# Patient Record
Sex: MALE | Race: WHITE | NOT HISPANIC OR LATINO | Employment: OTHER | URBAN - NONMETROPOLITAN AREA
[De-identification: names, ages, dates, MRNs, and addresses within clinical notes are randomized per-mention and may not be internally consistent; named-entity substitution may affect disease eponyms.]

---

## 2019-03-05 ENCOUNTER — HISTORICAL (OUTPATIENT)
Dept: ADMINISTRATIVE | Facility: HOSPITAL | Age: 44
End: 2019-03-05

## 2019-09-08 ENCOUNTER — HISTORICAL (OUTPATIENT)
Dept: ADMINISTRATIVE | Facility: HOSPITAL | Age: 44
End: 2019-09-08

## 2019-09-09 ENCOUNTER — HISTORICAL (OUTPATIENT)
Dept: ADMINISTRATIVE | Facility: HOSPITAL | Age: 44
End: 2019-09-09

## 2020-08-05 ENCOUNTER — HISTORICAL (OUTPATIENT)
Dept: ADMINISTRATIVE | Facility: HOSPITAL | Age: 45
End: 2020-08-05

## 2024-01-03 ENCOUNTER — LAB REQUISITION (OUTPATIENT)
Dept: LAB | Facility: HOSPITAL | Age: 49
End: 2024-01-03
Payer: MEDICAID

## 2024-01-03 DIAGNOSIS — K62.5 HEMORRHAGE OF ANUS AND RECTUM: ICD-10-CM

## 2024-01-03 DIAGNOSIS — Z12.5 ENCOUNTER FOR SCREENING FOR MALIGNANT NEOPLASM OF PROSTATE: ICD-10-CM

## 2024-01-03 LAB
ALBUMIN SERPL-MCNC: 4.3 G/DL (ref 3.4–5)
ALBUMIN/GLOB SERPL: 1.2 RATIO
ALP SERPL-CCNC: 81 UNIT/L (ref 50–144)
ALT SERPL-CCNC: 54 UNIT/L (ref 1–45)
ANION GAP SERPL CALC-SCNC: 8 MEQ/L (ref 2–13)
AST SERPL-CCNC: 56 UNIT/L (ref 17–59)
BASOPHILS # BLD AUTO: 0.03 X10(3)/MCL (ref 0.01–0.08)
BASOPHILS NFR BLD AUTO: 0.5 % (ref 0.1–1.2)
BILIRUB SERPL-MCNC: 1.3 MG/DL (ref 0–1)
BUN SERPL-MCNC: 16 MG/DL (ref 7–20)
CALCIUM SERPL-MCNC: 9.5 MG/DL (ref 8.4–10.2)
CHLORIDE SERPL-SCNC: 106 MMOL/L (ref 98–110)
CHOLEST SERPL-MCNC: 224 MG/DL (ref 0–200)
CO2 SERPL-SCNC: 25 MMOL/L (ref 21–32)
CREAT SERPL-MCNC: 0.91 MG/DL (ref 0.66–1.25)
CREAT/UREA NIT SERPL: 18 (ref 12–20)
EOSINOPHIL # BLD AUTO: 0.17 X10(3)/MCL (ref 0.04–0.54)
EOSINOPHIL NFR BLD AUTO: 2.6 % (ref 0.7–7)
ERYTHROCYTE [DISTWIDTH] IN BLOOD BY AUTOMATED COUNT: 12.3 %
GFR SERPLBLD CREATININE-BSD FMLA CKD-EPI: >90 MLS/MIN/1.73/M2
GLOBULIN SER-MCNC: 3.6 GM/DL (ref 2–3.9)
GLUCOSE SERPL-MCNC: 122 MG/DL (ref 70–115)
HCT VFR BLD AUTO: 48.6 % (ref 36–52)
HDLC SERPL-MCNC: 39 MG/DL (ref 40–60)
HGB BLD-MCNC: 16.5 G/DL (ref 13–18)
IMM GRANULOCYTES # BLD AUTO: 0.02 X10(3)/MCL (ref 0–0.03)
IMM GRANULOCYTES NFR BLD AUTO: 0.3 % (ref 0–0.5)
LDLC SERPL DIRECT ASSAY-SCNC: 138.6 MG/DL (ref 30–100)
LYMPHOCYTES # BLD AUTO: 1.53 X10(3)/MCL (ref 1.32–3.57)
LYMPHOCYTES NFR BLD AUTO: 23.1 % (ref 20–55)
MCH RBC QN AUTO: 31.3 PG (ref 27–34)
MCHC RBC AUTO-ENTMCNC: 34 G/DL (ref 31–37)
MCV RBC AUTO: 92.2 FL (ref 79–99)
MONOCYTES # BLD AUTO: 0.5 X10(3)/MCL (ref 0.3–0.82)
MONOCYTES NFR BLD AUTO: 7.6 % (ref 4.7–12.5)
NEUTROPHILS # BLD AUTO: 4.36 X10(3)/MCL (ref 1.78–5.38)
NEUTROPHILS NFR BLD AUTO: 65.9 % (ref 37–73)
NRBC BLD AUTO-RTO: 0 %
PLATELET # BLD AUTO: 206 X10(3)/MCL (ref 140–371)
PMV BLD AUTO: 11.6 FL (ref 9.4–12.4)
POTASSIUM SERPL-SCNC: 4.4 MMOL/L (ref 3.5–5.1)
PROT SERPL-MCNC: 7.9 GM/DL (ref 6.3–8.2)
PSA SERPL-MCNC: 0.46 NG/ML
RBC # BLD AUTO: 5.27 X10(6)/MCL (ref 4–6)
SODIUM SERPL-SCNC: 139 MMOL/L (ref 135–145)
TRIGL SERPL-MCNC: 168 MG/DL (ref 30–200)
WBC # SPEC AUTO: 6.61 X10(3)/MCL (ref 4–11.5)

## 2024-01-03 PROCEDURE — 84153 ASSAY OF PSA TOTAL: CPT | Performed by: FAMILY MEDICINE

## 2024-01-03 PROCEDURE — 85025 COMPLETE CBC W/AUTO DIFF WBC: CPT | Performed by: FAMILY MEDICINE

## 2024-01-03 PROCEDURE — 80053 COMPREHEN METABOLIC PANEL: CPT | Performed by: FAMILY MEDICINE

## 2024-01-03 PROCEDURE — 80061 LIPID PANEL: CPT | Performed by: FAMILY MEDICINE

## 2024-01-09 ENCOUNTER — HOSPITAL ENCOUNTER (OUTPATIENT)
Dept: PREADMISSION TESTING | Facility: HOSPITAL | Age: 49
Discharge: HOME | End: 2024-01-09
Attending: FAMILY MEDICINE
Payer: MEDICAID

## 2024-01-09 VITALS — WEIGHT: 315 LBS | BODY MASS INDEX: 42.66 KG/M2 | HEIGHT: 72 IN

## 2024-01-09 DIAGNOSIS — Z12.11 SCREENING FOR COLON CANCER: ICD-10-CM

## 2024-01-09 DIAGNOSIS — Z12.11 COLON CANCER SCREENING: Primary | ICD-10-CM

## 2024-01-09 RX ORDER — FLUTICASONE PROPIONATE AND SALMETEROL XINAFOATE 230; 21 UG/1; UG/1
2 AEROSOL, METERED RESPIRATORY (INHALATION) EVERY 12 HOURS
COMMUNITY
Start: 2023-12-21

## 2024-01-09 RX ORDER — DOXAZOSIN 1 MG/1
1 TABLET ORAL DAILY
COMMUNITY
Start: 2023-12-21

## 2024-01-09 RX ORDER — SODIUM CHLORIDE, SODIUM LACTATE, POTASSIUM CHLORIDE, CALCIUM CHLORIDE 600; 310; 30; 20 MG/100ML; MG/100ML; MG/100ML; MG/100ML
INJECTION, SOLUTION INTRAVENOUS CONTINUOUS
Status: CANCELLED | OUTPATIENT
Start: 2024-01-12

## 2024-01-09 RX ORDER — FUROSEMIDE 40 MG/1
40 TABLET ORAL DAILY
COMMUNITY
Start: 2023-12-21

## 2024-01-09 RX ORDER — PANTOPRAZOLE SODIUM 40 MG/1
40 TABLET, DELAYED RELEASE ORAL DAILY
COMMUNITY
Start: 2023-12-21

## 2024-01-09 RX ORDER — POTASSIUM CHLORIDE 20 MEQ/1
20 TABLET, EXTENDED RELEASE ORAL ONCE
COMMUNITY
Start: 2023-12-21

## 2024-01-09 RX ORDER — SOTALOL HYDROCHLORIDE 80 MG/1
80 TABLET ORAL 2 TIMES DAILY
COMMUNITY
Start: 2023-10-23

## 2024-01-09 RX ORDER — DILTIAZEM HYDROCHLORIDE EXTENDED-RELEASE TABLETS 360 MG/1
360 TABLET, EXTENDED RELEASE ORAL DAILY
COMMUNITY

## 2024-01-09 RX ORDER — RIVAROXABAN 20 MG/1
20 TABLET, FILM COATED ORAL DAILY
COMMUNITY
Start: 2023-12-21

## 2024-01-09 RX ORDER — ALBUTEROL SULFATE 90 UG/1
2 AEROSOL, METERED RESPIRATORY (INHALATION)
COMMUNITY
Start: 2023-10-12

## 2024-01-09 RX ORDER — ALLOPURINOL 100 MG/1
100 TABLET ORAL DAILY
COMMUNITY

## 2024-01-09 NOTE — DISCHARGE INSTRUCTIONS

## 2024-01-11 ENCOUNTER — ANESTHESIA EVENT (OUTPATIENT)
Dept: GASTROENTEROLOGY | Facility: HOSPITAL | Age: 49
End: 2024-01-11
Payer: MEDICAID

## 2024-01-11 NOTE — ANESTHESIA PREPROCEDURE EVALUATION
01/11/2024  Daniel Kirkland is a 49 y.o., male.      Pre-op Assessment    I have reviewed the Patient Summary Reports.     I have reviewed the Nursing Notes. I have reviewed the NPO Status.   I have reviewed the Medications.     Review of Systems  Anesthesia Hx:  No problems with previous Anesthesia             Denies Family Hx of Anesthesia complications.    Denies Personal Hx of Anesthesia complications.                    Social:  Smoker       Hematology/Oncology:  Hematology Normal   Oncology Normal                                   EENT/Dental:  EENT/Dental Normal           Cardiovascular:  Exercise tolerance: poor   Hypertension    Dysrhythmias atrial fibrillation                                   Pulmonary:        Sleep Apnea                Renal/:  Renal/ Normal                 Hepatic/GI:  Hepatic/GI Normal                 Musculoskeletal:  Musculoskeletal Normal                Neurological:  Neurology Normal                                      Endocrine:  Endocrine Normal          Morbid Obesity / BMI > 40  Dermatological:  Skin Normal    Psych:  Psychiatric Normal                    Physical Exam  General: Well nourished, Cooperative, Alert and Oriented    Airway:  Mallampati: II / II  Mouth Opening: Normal  TM Distance: Normal  Tongue: Normal  Neck ROM: Normal ROM    Dental:  Intact        Anesthesia Plan  Type of Anesthesia, risks & benefits discussed:    Anesthesia Type: MAC  Intra-op Monitoring Plan: Standard ASA Monitors  Post Op Pain Control Plan: multimodal analgesia  Induction:  IV  Informed Consent: Informed consent signed with the Patient and all parties understand the risks and agree with anesthesia plan.  All questions answered. Patient consented to blood products? Yes  ASA Score: 4  Day of Surgery Review of History & Physical: H&P Update referred to the surgeon/provider.I have  interviewed and examined the patient. I have reviewed the patient's H&P dated: There are no significant changes.     Ready For Surgery From Anesthesia Perspective.     .

## 2024-01-12 ENCOUNTER — ANESTHESIA (OUTPATIENT)
Dept: GASTROENTEROLOGY | Facility: HOSPITAL | Age: 49
End: 2024-01-12
Payer: MEDICAID

## 2024-01-12 ENCOUNTER — HOSPITAL ENCOUNTER (OUTPATIENT)
Dept: GASTROENTEROLOGY | Facility: HOSPITAL | Age: 49
Discharge: HOME | End: 2024-01-12
Attending: FAMILY MEDICINE
Payer: MEDICAID

## 2024-01-12 VITALS
SYSTOLIC BLOOD PRESSURE: 109 MMHG | TEMPERATURE: 98 F | RESPIRATION RATE: 20 BRPM | WEIGHT: 315 LBS | DIASTOLIC BLOOD PRESSURE: 73 MMHG | BODY MASS INDEX: 68.08 KG/M2 | HEART RATE: 68 BPM | OXYGEN SATURATION: 99 %

## 2024-01-12 DIAGNOSIS — Z12.11 COLON CANCER SCREENING: ICD-10-CM

## 2024-01-12 DIAGNOSIS — Z12.11 SCREENING FOR COLON CANCER: ICD-10-CM

## 2024-01-12 DIAGNOSIS — K62.5 HEMORRHAGE OF RECTUM AND ANUS: ICD-10-CM

## 2024-01-12 LAB — POCT GLUCOSE: 89 MG/DL (ref 70–110)

## 2024-01-12 PROCEDURE — 25000003 PHARM REV CODE 250: Performed by: NURSE ANESTHETIST, CERTIFIED REGISTERED

## 2024-01-12 PROCEDURE — 45378 DIAGNOSTIC COLONOSCOPY: CPT | Performed by: FAMILY MEDICINE

## 2024-01-12 PROCEDURE — 63600175 PHARM REV CODE 636 W HCPCS: Performed by: NURSE ANESTHETIST, CERTIFIED REGISTERED

## 2024-01-12 PROCEDURE — S5010 5% DEXTROSE AND 0.45% SALINE: HCPCS | Performed by: FAMILY MEDICINE

## 2024-01-12 PROCEDURE — 25000003 PHARM REV CODE 250: Performed by: FAMILY MEDICINE

## 2024-01-12 PROCEDURE — 82962 GLUCOSE BLOOD TEST: CPT

## 2024-01-12 PROCEDURE — 37000009 HC ANESTHESIA EA ADD 15 MINS

## 2024-01-12 PROCEDURE — 37000008 HC ANESTHESIA 1ST 15 MINUTES

## 2024-01-12 PROCEDURE — D9220A PRA ANESTHESIA: Mod: ,,, | Performed by: NURSE ANESTHETIST, CERTIFIED REGISTERED

## 2024-01-12 RX ORDER — SODIUM CHLORIDE, SODIUM LACTATE, POTASSIUM CHLORIDE, CALCIUM CHLORIDE 600; 310; 30; 20 MG/100ML; MG/100ML; MG/100ML; MG/100ML
INJECTION, SOLUTION INTRAVENOUS CONTINUOUS
Status: DISCONTINUED | OUTPATIENT
Start: 2024-01-12 | End: 2024-01-13 | Stop reason: HOSPADM

## 2024-01-12 RX ORDER — DEXTROSE MONOHYDRATE AND SODIUM CHLORIDE 5; .45 G/100ML; G/100ML
INJECTION, SOLUTION INTRAVENOUS CONTINUOUS
Status: DISCONTINUED | OUTPATIENT
Start: 2024-01-12 | End: 2024-01-13 | Stop reason: HOSPADM

## 2024-01-12 RX ORDER — SOTALOL HYDROCHLORIDE 80 MG/1
80 TABLET ORAL ONCE
Status: COMPLETED | OUTPATIENT
Start: 2024-01-12 | End: 2024-01-12

## 2024-01-12 RX ORDER — PROPOFOL 10 MG/ML
VIAL (ML) INTRAVENOUS
Status: DISCONTINUED | OUTPATIENT
Start: 2024-01-12 | End: 2024-01-12

## 2024-01-12 RX ORDER — LIDOCAINE HYDROCHLORIDE 20 MG/ML
INJECTION INTRAVENOUS
Status: DISCONTINUED | OUTPATIENT
Start: 2024-01-12 | End: 2024-01-12

## 2024-01-12 RX ADMIN — SOTALOL HYDROCHLORIDE 80 MG: 80 TABLET ORAL at 07:01

## 2024-01-12 RX ADMIN — PROPOFOL 50 MG: 10 INJECTION, EMULSION INTRAVENOUS at 09:01

## 2024-01-12 RX ADMIN — LIDOCAINE HYDROCHLORIDE 50 MG: 20 INJECTION, SOLUTION INTRAVENOUS at 09:01

## 2024-01-12 RX ADMIN — PROPOFOL 90 MG: 10 INJECTION, EMULSION INTRAVENOUS at 09:01

## 2024-01-12 RX ADMIN — DEXTROSE AND SODIUM CHLORIDE: 5; 450 INJECTION, SOLUTION INTRAVENOUS at 07:01

## 2024-01-12 NOTE — PLAN OF CARE
Pt stable and ready for discharge. Pt offered wheelchair,, declines. Pt ambulated out of dept with family at side.

## 2024-01-12 NOTE — LETTER
January 12, 2024      [facility]  [address] [place] [address]  Phone: [phone]  Fax: [phone]       Patient: Daniel Kirkland   YOB: 1975  Date of Visit: 01/12/2024    To Whom It May Concern:    Veronica Kirkland was with father,Olu Kirkland at [facility] on 01/12/2024. Please excuse from school. If you have any questions or concerns, or if I can be of further assistance, please do not hesitate to contact me.    Sincerely,    Latrice Flores RN

## 2024-01-12 NOTE — DISCHARGE INSTRUCTIONS
Follow-up with    Diet: as tolerated  Activity:  decrease activity today, no driving today, resume all activity tomorrow  Notify MD:  increased swelling of abdomen, excessive nausea/vomiting, excessive bright red bleeding from rectum  Medications:  continue your home medications. Keep a list of your home medications at all times for emergencies.

## 2024-01-12 NOTE — DISCHARGE SUMMARY
[facility]  Discharge Note  Short Stay    Colonoscopy      OUTCOME: Patient tolerated treatment/procedure well without complication and is now ready for discharge.    DISPOSITION: Home or Self Care    FINAL DIAGNOSIS:  <principal problem not specified>    FOLLOWUP: In clinic    DISCHARGE INSTRUCTIONS:  No discharge procedures on file.     TIME SPENT ON DISCHARGE:  minutes

## 2024-01-12 NOTE — ANESTHESIA POSTPROCEDURE EVALUATION
Anesthesia Post Evaluation    Patient: Daniel Kirkland    Procedure(s) Performed: * No procedures listed *    Final Anesthesia Type: MAC      Patient location during evaluation: OPS  Patient participation: Yes- Able to Participate  Level of consciousness: awake and alert, awake and oriented  Post-procedure vital signs: reviewed and stable  Pain management: adequate  Airway patency: patent    PONV status at discharge: No PONV  Anesthetic complications: no      Cardiovascular status: blood pressure returned to baseline  Respiratory status: unassisted, room air and spontaneous ventilation  Hydration status: euvolemic  Follow-up not needed.              Vitals Value Taken Time   /97 01/12/24 0645   Temp 36 °C (96.8 °F) 01/12/24 0645   Pulse 89 01/12/24 0645   Resp 22 01/12/24 0645   SpO2 95 % 01/12/24 0645         No case tracking events are documented in the log.      Pain/Zay Score: No data recorded

## 2024-07-23 ENCOUNTER — HOSPITAL ENCOUNTER (EMERGENCY)
Facility: HOSPITAL | Age: 49
Discharge: HOME | End: 2024-07-24
Attending: EMERGENCY MEDICINE
Payer: MEDICAID

## 2024-07-23 DIAGNOSIS — I21.4 NSTEMI (NON-ST ELEVATED MYOCARDIAL INFARCTION): Primary | ICD-10-CM

## 2024-07-23 DIAGNOSIS — R07.9 CHEST PAIN: ICD-10-CM

## 2024-07-23 LAB
ALBUMIN SERPL-MCNC: 3.9 G/DL (ref 3.4–5)
ALBUMIN/GLOB SERPL: 1.1 RATIO
ALP SERPL-CCNC: 85 UNIT/L (ref 50–144)
ALT SERPL-CCNC: 47 UNIT/L (ref 1–45)
ANION GAP SERPL CALC-SCNC: 7 MEQ/L (ref 2–13)
AST SERPL-CCNC: 41 UNIT/L (ref 17–59)
BASOPHILS # BLD AUTO: 0.04 X10(3)/MCL (ref 0.01–0.08)
BASOPHILS NFR BLD AUTO: 0.6 % (ref 0.1–1.2)
BILIRUB SERPL-MCNC: 1.1 MG/DL (ref 0–1)
BNP BLD-MCNC: 159 PG/ML (ref 0–124.9)
BUN SERPL-MCNC: 11 MG/DL (ref 7–20)
CALCIUM SERPL-MCNC: 9.4 MG/DL (ref 8.4–10.2)
CHLORIDE SERPL-SCNC: 109 MMOL/L (ref 98–110)
CO2 SERPL-SCNC: 24 MMOL/L (ref 21–32)
CREAT SERPL-MCNC: 0.91 MG/DL (ref 0.66–1.25)
CREAT/UREA NIT SERPL: 12 (ref 12–20)
D DIMER PPP IA.FEU-MCNC: 0.49 MG/L (ref 0.19–0.5)
EOSINOPHIL # BLD AUTO: 0.27 X10(3)/MCL (ref 0.04–0.54)
EOSINOPHIL NFR BLD AUTO: 3.8 % (ref 0.7–7)
ERYTHROCYTE [DISTWIDTH] IN BLOOD BY AUTOMATED COUNT: 12.8 %
GFR SERPLBLD CREATININE-BSD FMLA CKD-EPI: >90 ML/MIN/1.73/M2
GLOBULIN SER-MCNC: 3.7 GM/DL (ref 2–3.9)
GLUCOSE SERPL-MCNC: 139 MG/DL (ref 70–115)
HCT VFR BLD AUTO: 47.2 % (ref 36–52)
HGB BLD-MCNC: 15.9 G/DL (ref 13–18)
IMM GRANULOCYTES # BLD AUTO: 0.03 X10(3)/MCL (ref 0–0.03)
IMM GRANULOCYTES NFR BLD AUTO: 0.4 % (ref 0–0.5)
LIPASE SERPL-CCNC: 37 U/L (ref 23–300)
LYMPHOCYTES # BLD AUTO: 2.24 X10(3)/MCL (ref 1.32–3.57)
LYMPHOCYTES NFR BLD AUTO: 31.4 % (ref 20–55)
MCH RBC QN AUTO: 30.9 PG (ref 27–34)
MCHC RBC AUTO-ENTMCNC: 33.7 G/DL (ref 31–37)
MCV RBC AUTO: 91.7 FL (ref 79–99)
MONOCYTES # BLD AUTO: 0.59 X10(3)/MCL (ref 0.3–0.82)
MONOCYTES NFR BLD AUTO: 8.3 % (ref 4.7–12.5)
NEUTROPHILS # BLD AUTO: 3.97 X10(3)/MCL (ref 1.78–5.38)
NEUTROPHILS NFR BLD AUTO: 55.5 % (ref 37–73)
NRBC BLD AUTO-RTO: 0 %
PLATELET # BLD AUTO: 178 X10(3)/MCL (ref 140–371)
PMV BLD AUTO: 11.1 FL (ref 9.4–12.4)
POTASSIUM SERPL-SCNC: 4.4 MMOL/L (ref 3.5–5.1)
PROT SERPL-MCNC: 7.6 GM/DL (ref 6.3–8.2)
RBC # BLD AUTO: 5.15 X10(6)/MCL (ref 4–6)
SODIUM SERPL-SCNC: 140 MMOL/L (ref 136–145)
TROPONIN I SERPL-MCNC: <0.012 NG/ML (ref 0–0.03)
WBC # BLD AUTO: 7.14 X10(3)/MCL (ref 4–11.5)

## 2024-07-23 PROCEDURE — 25000003 PHARM REV CODE 250: Performed by: EMERGENCY MEDICINE

## 2024-07-23 PROCEDURE — 96374 THER/PROPH/DIAG INJ IV PUSH: CPT

## 2024-07-23 PROCEDURE — 99291 CRITICAL CARE FIRST HOUR: CPT

## 2024-07-23 PROCEDURE — 85025 COMPLETE CBC W/AUTO DIFF WBC: CPT | Performed by: EMERGENCY MEDICINE

## 2024-07-23 PROCEDURE — 83880 ASSAY OF NATRIURETIC PEPTIDE: CPT | Performed by: EMERGENCY MEDICINE

## 2024-07-23 PROCEDURE — 63600175 PHARM REV CODE 636 W HCPCS: Performed by: EMERGENCY MEDICINE

## 2024-07-23 PROCEDURE — 85379 FIBRIN DEGRADATION QUANT: CPT | Performed by: EMERGENCY MEDICINE

## 2024-07-23 PROCEDURE — 80053 COMPREHEN METABOLIC PANEL: CPT | Performed by: EMERGENCY MEDICINE

## 2024-07-23 PROCEDURE — 84484 ASSAY OF TROPONIN QUANT: CPT | Performed by: EMERGENCY MEDICINE

## 2024-07-23 PROCEDURE — 93010 ELECTROCARDIOGRAM REPORT: CPT | Mod: ,,, | Performed by: INTERNAL MEDICINE

## 2024-07-23 PROCEDURE — 93005 ELECTROCARDIOGRAM TRACING: CPT

## 2024-07-23 PROCEDURE — 25000242 PHARM REV CODE 250 ALT 637 W/ HCPCS

## 2024-07-23 PROCEDURE — 83690 ASSAY OF LIPASE: CPT | Performed by: EMERGENCY MEDICINE

## 2024-07-23 RX ORDER — KETOROLAC TROMETHAMINE 30 MG/ML
30 INJECTION, SOLUTION INTRAMUSCULAR; INTRAVENOUS
Status: COMPLETED | OUTPATIENT
Start: 2024-07-23 | End: 2024-07-23

## 2024-07-23 RX ORDER — NITROGLYCERIN 0.4 MG/1
TABLET SUBLINGUAL
Status: COMPLETED
Start: 2024-07-23 | End: 2024-07-23

## 2024-07-23 RX ORDER — ALUMINUM HYDROXIDE, MAGNESIUM HYDROXIDE, AND SIMETHICONE 1200; 120; 1200 MG/30ML; MG/30ML; MG/30ML
30 SUSPENSION ORAL ONCE
Status: COMPLETED | OUTPATIENT
Start: 2024-07-23 | End: 2024-07-23

## 2024-07-23 RX ORDER — NITROGLYCERIN 0.4 MG/1
0.4 TABLET SUBLINGUAL EVERY 5 MIN PRN
Status: DISCONTINUED | OUTPATIENT
Start: 2024-07-23 | End: 2024-07-24 | Stop reason: HOSPADM

## 2024-07-23 RX ORDER — LIDOCAINE HYDROCHLORIDE 20 MG/ML
15 SOLUTION OROPHARYNGEAL ONCE
Status: COMPLETED | OUTPATIENT
Start: 2024-07-23 | End: 2024-07-23

## 2024-07-23 RX ORDER — ASPIRIN 325 MG
325 TABLET ORAL
Status: COMPLETED | OUTPATIENT
Start: 2024-07-23 | End: 2024-07-23

## 2024-07-23 RX ADMIN — KETOROLAC TROMETHAMINE 30 MG: 30 INJECTION, SOLUTION INTRAMUSCULAR at 11:07

## 2024-07-23 RX ADMIN — NITROGLYCERIN 1 INCH: 20 OINTMENT TOPICAL at 09:07

## 2024-07-23 RX ADMIN — NITROGLYCERIN 1.2 MG: 0.4 TABLET SUBLINGUAL at 10:07

## 2024-07-23 RX ADMIN — LIDOCAINE HYDROCHLORIDE 15 ML: 20 SOLUTION ORAL at 10:07

## 2024-07-23 RX ADMIN — ALUMINUM HYDROXIDE, MAGNESIUM HYDROXIDE, AND SIMETHICONE 30 ML: 1200; 120; 1200 SUSPENSION ORAL at 10:07

## 2024-07-23 RX ADMIN — ASPIRIN 325 MG ORAL TABLET 325 MG: 325 PILL ORAL at 09:07

## 2024-07-24 ENCOUNTER — HOSPITAL ENCOUNTER (INPATIENT)
Facility: HOSPITAL | Age: 49
LOS: 1 days | Discharge: HOME | DRG: 251 | End: 2024-07-25
Attending: INTERNAL MEDICINE | Admitting: INTERNAL MEDICINE
Payer: MEDICAID

## 2024-07-24 VITALS
HEART RATE: 63 BPM | RESPIRATION RATE: 13 BRPM | TEMPERATURE: 98 F | SYSTOLIC BLOOD PRESSURE: 117 MMHG | DIASTOLIC BLOOD PRESSURE: 76 MMHG | WEIGHT: 315 LBS | BODY MASS INDEX: 42.66 KG/M2 | OXYGEN SATURATION: 95 % | HEIGHT: 72 IN

## 2024-07-24 DIAGNOSIS — R07.9 CHEST PAIN: ICD-10-CM

## 2024-07-24 DIAGNOSIS — I21.4 NSTEMI (NON-ST ELEVATED MYOCARDIAL INFARCTION): ICD-10-CM

## 2024-07-24 DIAGNOSIS — I25.10 CAD (CORONARY ARTERY DISEASE): ICD-10-CM

## 2024-07-24 LAB
APICAL FOUR CHAMBER EJECTION FRACTION: 57 %
APICAL TWO CHAMBER EJECTION FRACTION: 76 %
APTT PPP: 27.1 SECONDS (ref 23–29.4)
APTT PPP: 52 SECONDS (ref 23.2–33.7)
AV INDEX (PROSTH): 0.83
AV MEAN GRADIENT: 4 MMHG
AV PEAK GRADIENT: 7 MMHG
AV VALVE AREA BY VELOCITY RATIO: 2.72 CM²
AV VALVE AREA: 2.61 CM²
AV VELOCITY RATIO: 0.87
BASOPHILS # BLD AUTO: 0.04 X10(3)/MCL
BASOPHILS NFR BLD AUTO: 0.4 %
BSA FOR ECHO PROCEDURE: 3.45 M2
CATH EF QUANTITATIVE: 30 %
DOP CALC AO PEAK VEL: 1.36 M/S
DOP CALC AO VTI: 30.2 CM
DOP CALC LVOT AREA: 3.1 CM2
DOP CALC LVOT DIAMETER: 2 CM
DOP CALC LVOT PEAK VEL: 1.18 M/S
DOP CALC LVOT STROKE VOLUME: 78.81 CM3
DOP CALC MV VTI: 37 CM
DOP CALCLVOT PEAK VEL VTI: 25.1 CM
E WAVE DECELERATION TIME: 243 MSEC
E/A RATIO: 1.5
E/E' RATIO: 13.26 M/S
EOSINOPHIL # BLD AUTO: 0.15 X10(3)/MCL (ref 0–0.9)
EOSINOPHIL NFR BLD AUTO: 1.5 %
ERYTHROCYTE [DISTWIDTH] IN BLOOD BY AUTOMATED COUNT: 13.1 % (ref 11.5–17)
HCT VFR BLD AUTO: 43.3 % (ref 42–52)
HGB BLD-MCNC: 14.5 G/DL (ref 14–18)
IMM GRANULOCYTES # BLD AUTO: 0.04 X10(3)/MCL (ref 0–0.04)
IMM GRANULOCYTES NFR BLD AUTO: 0.4 %
INR PPP: 1.1
INR PPP: 1.6
LEFT ATRIUM AREA SYSTOLIC (APICAL 2 CHAMBER): 20.9 CM2
LEFT ATRIUM AREA SYSTOLIC (APICAL 4 CHAMBER): 18.2 CM2
LEFT ATRIUM SIZE: 4.3 CM
LEFT ATRIUM VOLUME INDEX MOD: 17 ML/M2
LEFT ATRIUM VOLUME MOD: 54.4 CM3
LEFT VENTRICLE DIASTOLIC VOLUME INDEX: 54.06 ML/M2
LEFT VENTRICLE DIASTOLIC VOLUME: 173 ML
LEFT VENTRICLE END DIASTOLIC VOLUME APICAL 2 CHAMBER: 149 ML
LEFT VENTRICLE END DIASTOLIC VOLUME APICAL 4 CHAMBER: 199 ML
LEFT VENTRICLE END SYSTOLIC VOLUME APICAL 2 CHAMBER: 62.9 ML
LEFT VENTRICLE END SYSTOLIC VOLUME APICAL 4 CHAMBER: 45.1 ML
LEFT VENTRICLE SYSTOLIC VOLUME INDEX: 19.1 ML/M2
LEFT VENTRICLE SYSTOLIC VOLUME: 61 ML
LV LATERAL E/E' RATIO: 11.45 M/S
LV SEPTAL E/E' RATIO: 15.75 M/S
LVOT MG: 3 MMHG
LVOT MV: 0.74 CM/S
LYMPHOCYTES # BLD AUTO: 2.32 X10(3)/MCL (ref 0.6–4.6)
LYMPHOCYTES NFR BLD AUTO: 22.5 %
MCH RBC QN AUTO: 31 PG (ref 27–31)
MCHC RBC AUTO-ENTMCNC: 33.5 G/DL (ref 33–36)
MCV RBC AUTO: 92.5 FL (ref 80–94)
MONOCYTES # BLD AUTO: 0.72 X10(3)/MCL (ref 0.1–1.3)
MONOCYTES NFR BLD AUTO: 7 %
MV MEAN GRADIENT: 3 MMHG
MV PEAK A VEL: 0.84 M/S
MV PEAK E VEL: 1.26 M/S
MV PEAK GRADIENT: 6 MMHG
MV STENOSIS PRESSURE HALF TIME: 64 MS
MV VALVE AREA BY CONTINUITY EQUATION: 2.13 CM2
MV VALVE AREA P 1/2 METHOD: 3.44 CM2
NEUTROPHILS # BLD AUTO: 7.06 X10(3)/MCL (ref 2.1–9.2)
NEUTROPHILS NFR BLD AUTO: 68.2 %
NRBC BLD AUTO-RTO: 0 %
OHS LV EJECTION FRACTION SIMPSONS BIPLANE MOD: 65 %
OHS QRS DURATION: 104 MS
OHS QRS DURATION: 106 MS
OHS QRS DURATION: 108 MS
OHS QTC CALCULATION: 445 MS
OHS QTC CALCULATION: 449 MS
OHS QTC CALCULATION: 461 MS
PISA TR MAX VEL: 0.81 M/S
PLATELET # BLD AUTO: 205 X10(3)/MCL (ref 130–400)
PMV BLD AUTO: 11.7 FL (ref 7.4–10.4)
PROTHROMBIN TIME: 11 SECONDS (ref 9.3–11.9)
PROTHROMBIN TIME: 18.7 SECONDS (ref 12.5–14.5)
PV PEAK GRADIENT: 4 MMHG
PV PEAK VELOCITY: 0.99 M/S
RA PRESSURE ESTIMATED: 15 MMHG
RBC # BLD AUTO: 4.68 X10(6)/MCL (ref 4.7–6.1)
RV TB RVSP: 16 MMHG
TDI LATERAL: 0.11 M/S
TDI SEPTAL: 0.08 M/S
TDI: 0.1 M/S
TR MAX PG: 3 MMHG
TRICUSPID ANNULAR PLANE SYSTOLIC EXCURSION: 2.73 CM
TROPONIN I SERPL-MCNC: 1.02 NG/ML (ref 0–0.03)
TROPONIN I SERPL-MCNC: 16.22 NG/ML (ref 0–0.04)
TROPONIN I SERPL-MCNC: 7.08 NG/ML (ref 0–0.03)
TV REST PULMONARY ARTERY PRESSURE: 18 MMHG
WBC # BLD AUTO: 10.33 X10(3)/MCL (ref 4.5–11.5)

## 2024-07-24 PROCEDURE — 21400001 HC TELEMETRY ROOM

## 2024-07-24 PROCEDURE — 36415 COLL VENOUS BLD VENIPUNCTURE: CPT | Performed by: INTERNAL MEDICINE

## 2024-07-24 PROCEDURE — 85610 PROTHROMBIN TIME: CPT | Performed by: EMERGENCY MEDICINE

## 2024-07-24 PROCEDURE — 85730 THROMBOPLASTIN TIME PARTIAL: CPT | Performed by: INTERNAL MEDICINE

## 2024-07-24 PROCEDURE — 4A023N7 MEASUREMENT OF CARDIAC SAMPLING AND PRESSURE, LEFT HEART, PERCUTANEOUS APPROACH: ICD-10-PCS | Performed by: INTERNAL MEDICINE

## 2024-07-24 PROCEDURE — 36415 COLL VENOUS BLD VENIPUNCTURE: CPT | Performed by: NURSE PRACTITIONER

## 2024-07-24 PROCEDURE — 96375 TX/PRO/DX INJ NEW DRUG ADDON: CPT

## 2024-07-24 PROCEDURE — 99900035 HC TECH TIME PER 15 MIN (STAT)

## 2024-07-24 PROCEDURE — 99900031 HC PATIENT EDUCATION (STAT)

## 2024-07-24 PROCEDURE — 93010 ELECTROCARDIOGRAM REPORT: CPT | Mod: ,,, | Performed by: INTERNAL MEDICINE

## 2024-07-24 PROCEDURE — 25000003 PHARM REV CODE 250: Performed by: INTERNAL MEDICINE

## 2024-07-24 PROCEDURE — 85610 PROTHROMBIN TIME: CPT | Performed by: NURSE PRACTITIONER

## 2024-07-24 PROCEDURE — 11000001 HC ACUTE MED/SURG PRIVATE ROOM

## 2024-07-24 PROCEDURE — 02703ZZ DILATION OF CORONARY ARTERY, ONE ARTERY, PERCUTANEOUS APPROACH: ICD-10-PCS | Performed by: INTERNAL MEDICINE

## 2024-07-24 PROCEDURE — 92920 PRQ TRLUML C ANGIOP 1ART&/BR: CPT | Mod: LD | Performed by: INTERNAL MEDICINE

## 2024-07-24 PROCEDURE — 63600175 PHARM REV CODE 636 W HCPCS: Performed by: NURSE PRACTITIONER

## 2024-07-24 PROCEDURE — 93005 ELECTROCARDIOGRAM TRACING: CPT

## 2024-07-24 PROCEDURE — 63600175 PHARM REV CODE 636 W HCPCS: Performed by: INTERNAL MEDICINE

## 2024-07-24 PROCEDURE — 99152 MOD SED SAME PHYS/QHP 5/>YRS: CPT | Performed by: INTERNAL MEDICINE

## 2024-07-24 PROCEDURE — 25500020 PHARM REV CODE 255: Performed by: INTERNAL MEDICINE

## 2024-07-24 PROCEDURE — 93458 L HRT ARTERY/VENTRICLE ANGIO: CPT | Mod: 59 | Performed by: INTERNAL MEDICINE

## 2024-07-24 PROCEDURE — 63600175 PHARM REV CODE 636 W HCPCS: Performed by: EMERGENCY MEDICINE

## 2024-07-24 PROCEDURE — 94760 N-INVAS EAR/PLS OXIMETRY 1: CPT

## 2024-07-24 PROCEDURE — C1769 GUIDE WIRE: HCPCS | Performed by: INTERNAL MEDICINE

## 2024-07-24 PROCEDURE — 27201423 OPTIME MED/SURG SUP & DEVICES STERILE SUPPLY: Performed by: INTERNAL MEDICINE

## 2024-07-24 PROCEDURE — 25500020 PHARM REV CODE 255: Performed by: NURSE PRACTITIONER

## 2024-07-24 PROCEDURE — 99153 MOD SED SAME PHYS/QHP EA: CPT | Performed by: INTERNAL MEDICINE

## 2024-07-24 PROCEDURE — B2151ZZ FLUOROSCOPY OF LEFT HEART USING LOW OSMOLAR CONTRAST: ICD-10-PCS | Performed by: INTERNAL MEDICINE

## 2024-07-24 PROCEDURE — 84484 ASSAY OF TROPONIN QUANT: CPT | Performed by: NURSE PRACTITIONER

## 2024-07-24 PROCEDURE — C1887 CATHETER, GUIDING: HCPCS | Performed by: INTERNAL MEDICINE

## 2024-07-24 PROCEDURE — C1894 INTRO/SHEATH, NON-LASER: HCPCS | Performed by: INTERNAL MEDICINE

## 2024-07-24 PROCEDURE — B2111ZZ FLUOROSCOPY OF MULTIPLE CORONARY ARTERIES USING LOW OSMOLAR CONTRAST: ICD-10-PCS | Performed by: INTERNAL MEDICINE

## 2024-07-24 PROCEDURE — 85025 COMPLETE CBC W/AUTO DIFF WBC: CPT | Performed by: NURSE PRACTITIONER

## 2024-07-24 PROCEDURE — 85730 THROMBOPLASTIN TIME PARTIAL: CPT | Performed by: EMERGENCY MEDICINE

## 2024-07-24 PROCEDURE — C1725 CATH, TRANSLUMIN NON-LASER: HCPCS | Performed by: INTERNAL MEDICINE

## 2024-07-24 PROCEDURE — 25000003 PHARM REV CODE 250: Performed by: NURSE PRACTITIONER

## 2024-07-24 RX ORDER — CLOPIDOGREL BISULFATE 300 MG/1
TABLET, FILM COATED ORAL
Status: DISCONTINUED | OUTPATIENT
Start: 2024-07-24 | End: 2024-07-24 | Stop reason: HOSPADM

## 2024-07-24 RX ORDER — HEPARIN SODIUM,PORCINE/D5W 25000/250
0-40 INTRAVENOUS SOLUTION INTRAVENOUS CONTINUOUS
Status: DISCONTINUED | OUTPATIENT
Start: 2024-07-24 | End: 2024-07-24

## 2024-07-24 RX ORDER — MORPHINE SULFATE 4 MG/ML
4 INJECTION, SOLUTION INTRAMUSCULAR; INTRAVENOUS ONCE
Status: COMPLETED | OUTPATIENT
Start: 2024-07-24 | End: 2024-07-24

## 2024-07-24 RX ORDER — ACETAMINOPHEN 325 MG/1
650 TABLET ORAL EVERY 4 HOURS PRN
Status: DISCONTINUED | OUTPATIENT
Start: 2024-07-24 | End: 2024-07-25 | Stop reason: HOSPADM

## 2024-07-24 RX ORDER — HEPARIN SODIUM,PORCINE/D5W 25000/250
0-40 INTRAVENOUS SOLUTION INTRAVENOUS CONTINUOUS
Status: DISCONTINUED | OUTPATIENT
Start: 2024-07-24 | End: 2024-07-24 | Stop reason: HOSPADM

## 2024-07-24 RX ORDER — FUROSEMIDE 10 MG/ML
40 INJECTION INTRAMUSCULAR; INTRAVENOUS EVERY 12 HOURS
Status: DISCONTINUED | OUTPATIENT
Start: 2024-07-24 | End: 2024-07-24

## 2024-07-24 RX ORDER — HYDRALAZINE HYDROCHLORIDE 20 MG/ML
10 INJECTION INTRAMUSCULAR; INTRAVENOUS EVERY 4 HOURS PRN
Status: DISCONTINUED | OUTPATIENT
Start: 2024-07-24 | End: 2024-07-25 | Stop reason: HOSPADM

## 2024-07-24 RX ORDER — ALUMINUM HYDROXIDE, MAGNESIUM HYDROXIDE, AND SIMETHICONE 1200; 120; 1200 MG/30ML; MG/30ML; MG/30ML
SUSPENSION ORAL
Status: DISCONTINUED | OUTPATIENT
Start: 2024-07-24 | End: 2024-07-24 | Stop reason: HOSPADM

## 2024-07-24 RX ORDER — CLOPIDOGREL BISULFATE 75 MG/1
75 TABLET ORAL DAILY
Status: DISCONTINUED | OUTPATIENT
Start: 2024-07-25 | End: 2024-07-25 | Stop reason: HOSPADM

## 2024-07-24 RX ORDER — VERAPAMIL HYDROCHLORIDE 2.5 MG/ML
INJECTION, SOLUTION INTRAVENOUS
Status: DISCONTINUED | OUTPATIENT
Start: 2024-07-24 | End: 2024-07-24 | Stop reason: HOSPADM

## 2024-07-24 RX ORDER — HYDROCODONE BITARTRATE AND ACETAMINOPHEN 5; 325 MG/1; MG/1
1 TABLET ORAL EVERY 4 HOURS PRN
Status: DISCONTINUED | OUTPATIENT
Start: 2024-07-24 | End: 2024-07-25 | Stop reason: HOSPADM

## 2024-07-24 RX ORDER — SODIUM CHLORIDE 0.9 % (FLUSH) 0.9 %
10 SYRINGE (ML) INJECTION
Status: DISCONTINUED | OUTPATIENT
Start: 2024-07-24 | End: 2024-07-25 | Stop reason: HOSPADM

## 2024-07-24 RX ORDER — MIDAZOLAM HYDROCHLORIDE 1 MG/ML
INJECTION INTRAMUSCULAR; INTRAVENOUS
Status: DISCONTINUED | OUTPATIENT
Start: 2024-07-24 | End: 2024-07-24 | Stop reason: HOSPADM

## 2024-07-24 RX ORDER — PANTOPRAZOLE SODIUM 40 MG/1
40 TABLET, DELAYED RELEASE ORAL DAILY
Status: DISCONTINUED | OUTPATIENT
Start: 2024-07-25 | End: 2024-07-25 | Stop reason: HOSPADM

## 2024-07-24 RX ORDER — MUPIROCIN 20 MG/G
OINTMENT TOPICAL 2 TIMES DAILY
Status: DISCONTINUED | OUTPATIENT
Start: 2024-07-25 | End: 2024-07-25 | Stop reason: HOSPADM

## 2024-07-24 RX ORDER — HEPARIN SODIUM,PORCINE/D5W 25000/250
0-40 INTRAVENOUS SOLUTION INTRAVENOUS CONTINUOUS
Status: DISCONTINUED | OUTPATIENT
Start: 2024-07-24 | End: 2024-07-25

## 2024-07-24 RX ORDER — SODIUM CHLORIDE 9 MG/ML
INJECTION, SOLUTION INTRAVENOUS CONTINUOUS
Status: ACTIVE | OUTPATIENT
Start: 2024-07-24 | End: 2024-07-24

## 2024-07-24 RX ORDER — FENTANYL CITRATE 50 UG/ML
INJECTION, SOLUTION INTRAMUSCULAR; INTRAVENOUS
Status: DISCONTINUED | OUTPATIENT
Start: 2024-07-24 | End: 2024-07-24 | Stop reason: HOSPADM

## 2024-07-24 RX ORDER — FUROSEMIDE 10 MG/ML
40 INJECTION INTRAMUSCULAR; INTRAVENOUS EVERY 12 HOURS
Status: DISCONTINUED | OUTPATIENT
Start: 2024-07-24 | End: 2024-07-25

## 2024-07-24 RX ORDER — NITROGLYCERIN 5 MG/ML
INJECTION, SOLUTION INTRAVENOUS
Status: DISCONTINUED | OUTPATIENT
Start: 2024-07-24 | End: 2024-07-24 | Stop reason: HOSPADM

## 2024-07-24 RX ORDER — HEPARIN SODIUM 1000 [USP'U]/ML
INJECTION, SOLUTION INTRAVENOUS; SUBCUTANEOUS
Status: DISCONTINUED | OUTPATIENT
Start: 2024-07-24 | End: 2024-07-24 | Stop reason: HOSPADM

## 2024-07-24 RX ORDER — LIDOCAINE HYDROCHLORIDE 10 MG/ML
INJECTION, SOLUTION EPIDURAL; INFILTRATION; INTRACAUDAL; PERINEURAL
Status: DISCONTINUED | OUTPATIENT
Start: 2024-07-24 | End: 2024-07-24 | Stop reason: HOSPADM

## 2024-07-24 RX ORDER — ATORVASTATIN CALCIUM 40 MG/1
40 TABLET, FILM COATED ORAL NIGHTLY
Status: DISCONTINUED | OUTPATIENT
Start: 2024-07-24 | End: 2024-07-25 | Stop reason: HOSPADM

## 2024-07-24 RX ORDER — ONDANSETRON HYDROCHLORIDE 2 MG/ML
4 INJECTION, SOLUTION INTRAVENOUS EVERY 8 HOURS PRN
Status: DISCONTINUED | OUTPATIENT
Start: 2024-07-24 | End: 2024-07-25 | Stop reason: HOSPADM

## 2024-07-24 RX ORDER — SOTALOL HYDROCHLORIDE 80 MG/1
80 TABLET ORAL 2 TIMES DAILY
Status: DISCONTINUED | OUTPATIENT
Start: 2024-07-24 | End: 2024-07-25 | Stop reason: HOSPADM

## 2024-07-24 RX ORDER — MORPHINE SULFATE 4 MG/ML
2 INJECTION, SOLUTION INTRAMUSCULAR; INTRAVENOUS EVERY 4 HOURS PRN
Status: DISCONTINUED | OUTPATIENT
Start: 2024-07-24 | End: 2024-07-25 | Stop reason: HOSPADM

## 2024-07-24 RX ORDER — ONDANSETRON HYDROCHLORIDE 2 MG/ML
4 INJECTION, SOLUTION INTRAVENOUS
Status: COMPLETED | OUTPATIENT
Start: 2024-07-24 | End: 2024-07-24

## 2024-07-24 RX ORDER — ASPIRIN 81 MG/1
81 TABLET ORAL DAILY
Status: DISCONTINUED | OUTPATIENT
Start: 2024-07-24 | End: 2024-07-25 | Stop reason: HOSPADM

## 2024-07-24 RX ADMIN — MORPHINE SULFATE 4 MG: 4 INJECTION, SOLUTION INTRAMUSCULAR; INTRAVENOUS at 12:07

## 2024-07-24 RX ADMIN — PERFLUTREN 1 ML: 6.52 INJECTION, SUSPENSION INTRAVENOUS at 03:07

## 2024-07-24 RX ADMIN — HEPARIN SODIUM 12 UNITS/KG/HR: 10000 INJECTION, SOLUTION INTRAVENOUS at 09:07

## 2024-07-24 RX ADMIN — ATORVASTATIN CALCIUM 40 MG: 40 TABLET, FILM COATED ORAL at 08:07

## 2024-07-24 RX ADMIN — HEPARIN SODIUM 12 UNITS/KG/HR: 10000 INJECTION, SOLUTION INTRAVENOUS at 05:07

## 2024-07-24 RX ADMIN — SOTALOL HYDROCHLORIDE 80 MG: 80 TABLET ORAL at 08:07

## 2024-07-24 RX ADMIN — ASPIRIN 81 MG: 81 TABLET, COATED ORAL at 09:07

## 2024-07-24 RX ADMIN — HEPARIN SODIUM 12 UNITS/KG/HR: 10000 INJECTION, SOLUTION INTRAVENOUS at 12:07

## 2024-07-24 RX ADMIN — FUROSEMIDE 40 MG: 10 INJECTION, SOLUTION INTRAMUSCULAR; INTRAVENOUS at 12:07

## 2024-07-24 RX ADMIN — SODIUM CHLORIDE: 9 INJECTION, SOLUTION INTRAVENOUS at 12:07

## 2024-07-24 RX ADMIN — ONDANSETRON 4 MG: 2 INJECTION INTRAMUSCULAR; INTRAVENOUS at 12:07

## 2024-07-24 RX ADMIN — FUROSEMIDE 40 MG: 10 INJECTION, SOLUTION INTRAMUSCULAR; INTRAVENOUS at 08:07

## 2024-07-24 NOTE — H&P
Patient name: Daniel Kirkland  MRN: [id number]  : 1975  Cath Lab Procedure H&P Update    Pre-Procedure Assessment:    I saw and examined the patient face to face. The patient has been re-evaluated and his condition is unchanged. The reason for admission, procedure and care is still present.  Based on the patients H&P, pre-procedure physical exam, relevant diagnostic studies, NPO status and information obtained from the patient, I determine the patient is an appropriate candidate for the proposed procedure and anesthesia planned. I further certify the anesthesia risks, benefits and options have been explained to the patient to which he agrees as documented on the procedural consent.    See scanned updated H&P and additional records from media tab.      Calvin Johnson

## 2024-07-24 NOTE — PLAN OF CARE
Problem: Adult Inpatient Plan of Care  Goal: Plan of Care Review  Outcome: Progressing  Goal: Patient-Specific Goal (Individualized)  Outcome: Progressing  Goal: Absence of Hospital-Acquired Illness or Injury  Outcome: Progressing  Goal: Optimal Comfort and Wellbeing  Outcome: Progressing  Goal: Readiness for Transition of Care  Outcome: Progressing     Problem: Bariatric Environmental Safety  Goal: Safety Maintained with Care  Outcome: Progressing     Problem: Fall Injury Risk  Goal: Absence of Fall and Fall-Related Injury  Outcome: Progressing

## 2024-07-24 NOTE — NURSING
Nurses Note -- 4 Eyes      7/24/2024   6:35 AM      Skin assessed during: Admit      [x] No Altered Skin Integrity Present    [x]Prevention Measures Documented      [] Yes- Altered Skin Integrity Present or Discovered   [] LDA Added if Not in Epic (Describe Wound)   [] New Altered Skin Integrity was Present on Admit and Documented in LDA   [] Wound Image Taken    Wound Care Consulted? No    Attending Nurse:  Cary Abreu RN/Staff Member:   Gold

## 2024-07-24 NOTE — ED PROVIDER NOTES
Encounter Date: 7/23/2024       History     Chief Complaint   Patient presents with    Chest Pain     Reports that he started having right sided chest pain that moved into the center of his chest. Ems reports EKG showed possible av block. Denies sob, dizziness, and nausea. Pmh of htn, GERD, CHF,      49-year-old male past medical history of atrial fibrillation, CHF, obstructive sleep apnea, hypertension, reflux, presents emergency department with chest pain.  Patient says that started about 2 hours ago in his right shoulder.  He says it moved to his right chest.  Describes it as a burning and aching type pain in his chest.  Not associated with any vomiting or any diaphoresis no associated shortness of breath.  Says he has never had this type of pain before.  Does not have a history of AFib.  He is not sure if he has any coronary artery disease.  He says that they did do an angiogram but he is not sure what they found he thinks he might have a stent but he is not sure.  His cardiologist is Dr. Jacobs.      Review of patient's allergies indicates:  No Known Allergies  Past Medical History:   Diagnosis Date    A-fib     CHF (congestive heart failure)     GERD (gastroesophageal reflux disease)     Hypertension     Lung disorder     Sleep apnea, unspecified      Past Surgical History:   Procedure Laterality Date    ANGIOGRAM, CORONARY, WITH LEFT HEART CATHETERIZATION       No family history on file.  Social History     Tobacco Use    Smoking status: Every Day     Types: Vaping with nicotine    Smokeless tobacco: Never   Substance Use Topics    Alcohol use: Never    Drug use: Never     Review of Systems   Constitutional:  Negative for chills and fever.   HENT:  Negative for sore throat.    Respiratory:  Negative for shortness of breath.    Cardiovascular:  Positive for chest pain. Negative for palpitations.   Gastrointestinal:  Negative for abdominal pain, nausea and vomiting.   Genitourinary:  Negative for dysuria.    Musculoskeletal:  Negative for back pain.   Skin:  Negative for rash.   Neurological:  Negative for weakness.   Hematological:  Does not bruise/bleed easily.   All other systems reviewed and are negative.      Physical Exam     Initial Vitals [07/23/24 2121]   BP Pulse Resp Temp SpO2   (!) 173/109 76 20 98.1 °F (36.7 °C) 98 %      MAP       --         Physical Exam    Nursing note and vitals reviewed.  Constitutional: He appears well-developed and well-nourished. He is not diaphoretic. No distress.   Severely morbidly obese   HENT:   Head: Normocephalic and atraumatic.   Mouth/Throat: Oropharynx is clear and moist. No oropharyngeal exudate.   Eyes: Conjunctivae and EOM are normal. Pupils are equal, round, and reactive to light.   Neck: Neck supple. No tracheal deviation present.   Cardiovascular:  Normal rate, regular rhythm, normal heart sounds and intact distal pulses.           No murmur heard.  Pulmonary/Chest: Breath sounds normal. No stridor. No respiratory distress. He has no wheezes. He has no rhonchi. He has no rales.   Abdominal: Abdomen is soft. Bowel sounds are normal. He exhibits no distension. There is no abdominal tenderness. There is no rebound and no guarding.   Musculoskeletal:         General: No tenderness or edema. Normal range of motion.      Cervical back: Neck supple.     Neurological: He is alert and oriented to person, place, and time. He has normal strength. No cranial nerve deficit or sensory deficit.   Skin: Skin is warm and dry. Capillary refill takes less than 2 seconds. No rash noted. No erythema. No pallor.   Psychiatric: He has a normal mood and affect. His behavior is normal. Thought content normal.         ED Course   Procedures  Labs Reviewed   COMPREHENSIVE METABOLIC PANEL - Abnormal       Result Value    Sodium 140      Potassium 4.4      Chloride 109      CO2 24      Glucose 139 (*)     Blood Urea Nitrogen 11      Creatinine 0.91      Calcium 9.4      Protein Total 7.6       Albumin 3.9      Globulin 3.7      Albumin/Globulin Ratio 1.1      Bilirubin Total 1.1 (*)     ALP 85      ALT 47 (*)     AST 41      eGFR >90      Anion Gap 7.0      BUN/Creatinine Ratio 12     NT-PRO NATRIURETIC PEPTIDE - Abnormal    ProBNP 159.0 (*)    TROPONIN I - Abnormal    Troponin-I 1.020 (*)    TROPONIN I - Abnormal    Troponin-I 7.080 (*)    TROPONIN I - Normal    Troponin-I <0.012     LIPASE - Normal    Lipase Level 37     D DIMER, QUANTITATIVE - Normal    D-Dimer 0.49     APTT - Normal    PTT 27.1     PROTIME-INR - Normal    PT 11.0      INR 1.1      Narrative:     .  Protimes are used to monitor anticoagulant agents such as warfarin. PT INR values are based on the current patient normal mean and the GUILLAUME value for the specific instrument reagent used.  **Routine theraputic target values for the INR are 2.0-3.0**   CBC W/ AUTO DIFFERENTIAL    Narrative:     The following orders were created for panel order CBC auto differential.  Procedure                               Abnormality         Status                     ---------                               -----------         ------                     CBC with Differential[[id number]]                           Final result                 Please view results for these tests on the individual orders.   CBC WITH DIFFERENTIAL    WBC 7.14      RBC 5.15      Hgb 15.9      Hct 47.2      MCV 91.7      MCH 30.9      MCHC 33.7      RDW 12.8      Platelet 178      MPV 11.1      Neut % 55.5      Lymph % 31.4      Mono % 8.3      Eos % 3.8      Basophil % 0.6      Lymph # 2.24      Neut # 3.97      Mono # 0.59      Eos # 0.27      Baso # 0.04      IG# 0.03      IG% 0.4      NRBC% 0.0     APTT          Imaging Results              X-Ray Chest AP Portable (Preliminary result)  Result time 07/23/24 21:56:34      Wet Read by Brenden Romero DO (07/23/24 21:56:34, [facility])    No Acute process but limited by artifact and  obesity                                     Medications   nitroGLYCERIN SL tablet 0.4 mg (has no administration in time range)   heparin 25,000 units in dextrose 5% 250 mL (100 units/mL) infusion LOW INTENSITY nomogram - [facility] (12 Units/kg/hr × 141.7 kg (Adjusted) Intravenous New Bag 7/24/24 0030)   heparin 25,000 units in dextrose 5% (100 units/ml) IV bolus from bag LOW INTENSITY NOMOGRAM - [facility] (has no administration in time range)   heparin 25,000 units in dextrose 5% (100 units/ml) IV bolus from bag LOW INTENSITY NOMOGRAM - [facility] (has no administration in time range)   aspirin tablet 325 mg (325 mg Oral Given 7/23/24 2121)   nitroGLYCERIN 2% TD oint ointment 1 inch (1 inch Topical (Top) Given 7/23/24 2120)   nitroGLYCERIN (NITROSTAT) 0.4 MG SL tablet (1.2 mg  Given 7/23/24 2200)   aluminum-magnesium hydroxide-simethicone 200-200-20 mg/5 mL suspension 30 mL (30 mLs Oral Given 7/23/24 2248)     And   LIDOcaine viscous HCl 2% oral solution 15 mL (15 mLs Oral Given 7/23/24 2248)   ketorolac injection 30 mg (30 mg Intravenous Given 7/23/24 2351)   heparin 25,000 units in dextrose 5% (100 units/ml) IV bolus from bag LOW INTENSITY NOMOGRAM - [facility] (4,000 Units Intravenous Bolus from Bag 7/24/24 0030)   morphine injection 4 mg (4 mg Intravenous Given 7/24/24 0022)   ondansetron injection 4 mg (4 mg Intravenous Given 7/24/24 0024)     Medical Decision Making  Differential includes but not limited to chest wall pain, acute coronary syndrome, PE, pericarditis, myocarditis, pericardial tamponade, aortic dissection, pneumonia, pneumothorax, Boerhaave syndrome, anemia, electrolyte abnormalities, herpes zoster, pancreatitis, anxiety, chest wall strain, costochondritis    Emergent evaluation of 49-year-old male presents emergency department with chest pain right arm pain as mentioned above.  Patient emergency department has received aspirin 325, 3 sublingual nitroglycerin nitroglycerin topical paste.  Still hurting.  Repeat EKG  obtained, no change.  D-dimer sent, negative.  Patient's 1st troponin is negative, 2nd troponin is trended up to 1.0.  Concern for NSTEMI.  Per Cardiology request I gave Toradol.  Helped a little bit.  Given patient's still with mild 5/10 pain will give a dose of morphine and Zofran.  I will also start the patient on heparin drip and give heparin bolus for NSTEMI.  Patient will be transferred to facility with interventional cardiology.  Patient needs angiogram.    A dictation software program was used for this note.  Please expect some simple typographical  errors in this note.       Amount and/or Complexity of Data Reviewed  Independent Historian: friend  External Data Reviewed: labs, ECG and notes.  Labs: ordered. Decision-making details documented in ED Course.  Radiology: ordered and independent interpretation performed. Decision-making details documented in ED Course.  ECG/medicine tests: ordered and independent interpretation performed. Decision-making details documented in ED Course.    Risk  OTC drugs.  Prescription drug management.  Decision regarding hospitalization.              Attending Attestation:             Attending ED Notes:   Attending Critical Care:   Critical Care Times:   Direct Patient Care (initial evaluation, reassessments, and time considering the case)................................................................10 minutes.   Additional History from reviewing old medical records or taking additional history from the family, EMS, PCP, etc.......................10 minutes.   Ordering, Reviewing, and Interpreting Diagnostic Studies...............................................................................................................10 minutes.   Documentation..................................................................................................................................................................................5 minutes.    ==============================================================  · Total Critical Care Time - exclusive of procedural time: 35 minutes.  ==============================================================  Critical care was necessary to treat or prevent imminent or life-threatening deterioration of the following conditions:  NSTEMI.   Critical care was time spent personally by me on the following activities: obtaining history from patient or relative, examination of patient, review of x-rays / CT sent with the patient, ordering lab, x-rays, and/or EKG, development of treatment plan with patient or relative, ordering and performing treatments and interventions, interpretation of cardiac measurements and re-evaluation of patient's conition.   Critical Care Condition: potentially life-threatening         ED Course as of 07/24/24 0418   Tue Jul 23, 2024 2119 EKG 9:18 p.m. normal sinus rhythm rate of 76.  No ST elevation or depression.  No STEMI.  EKG interpreted independently by me.   [JR]   2308 Patient is still complaining of chest pain despite 3 nitro and GI cocktail.  Will repeat EKG.  Will repeat 2nd troponin and add on a D-dimer. [JR]   2320 EKG 11:15 p.m. normal sinus rhythm rate of 65.  No ST elevation or depression, first-degree AV block noted.  No STEMI.  EKG interpreted independently by me.   [JR]   2335 Patient seen by Matilda Camargo from [facility].  She recommended 2nd troponin and if trending up that the patient would need to be transferred but if negative then patient could be discharged on an outpatient basis or admitted for observation either way.  They will be following up with the patient regardless.  She recommended giving the patient a dose of Toradol. [JR]   Wed Jul 24, 2024   0142 Still awaiting acceptance at [facility] [JR]   0222 Dr Pak from [facility] accepted the patient as a transfer [JR]   0319 Troponin up trending, awaiting transport to [facility].  Patient  chest pain 1 to 2/10.  No distress.  Heparin drip ongoing [JR]      ED Course User Index  [JR] Brenden Romero DO                           Clinical Impression:  Final diagnoses:  [R07.9] Chest pain  [I21.4] NSTEMI (non-ST elevated myocardial infarction) (Primary)          ED Disposition Condition    Transfer to Another Facility Stable                Brenden Romero DO  07/24/24 0025       Brenden Romero DO  07/24/24 0418

## 2024-07-24 NOTE — Clinical Note
The catheter insertion attempt was made into the ostium   left main. The catheter was unable to engage the area..

## 2024-07-24 NOTE — PLAN OF CARE
07/24/24 0856   Discharge Assessment   Assessment Type Discharge Planning Assessment   Confirmed/corrected address, phone number and insurance Yes   Confirmed Demographics Correct on Facesheet   Source of Information patient   When was your last doctors appointment? 04/08/24  (Dr Simmons)   Does patient/caregiver understand observation status Yes   Communicated FERNY with patient/caregiver Yes   Reason For Admission NSTEMI   People in Home child(iqra), adult  (adult children live with pt)   Facility Arrived From: home   Do you expect to return to your current living situation? Yes   Do you have help at home or someone to help you manage your care at home? Yes   Who are your caregiver(s) and their phone number(s)? family   Prior to hospitilization cognitive status: Alert/Oriented   Current cognitive status: Alert/Oriented   Walking or Climbing Stairs Difficulty no   Dressing/Bathing Difficulty no   Home Layout Able to live on 1st floor  (2 steps to enter home)   Equipment Currently Used at Home CPAP   Readmission within 30 days? No   Patient currently being followed by outpatient case management? No   Do you currently have service(s) that help you manage your care at home? No   Do you take prescription medications? Yes   Do you have any problems affording any of your prescribed medications? No   Is the patient taking medications as prescribed? yes   Who is going to help you get home at discharge? [facility]   How do you get to doctors appointments? car, drives self   Are you on dialysis? No   Do you take coumadin? No   Discharge Plan A Home   Discharge Plan B Home Health   DME Needed Upon Discharge  none   Discharge Plan discussed with: Patient   Transition of Care Barriers None   Housing Stability   In the last 12 months, was there a time when you were not able to pay the mortgage or rent on time? N   At any time in the past 12 months, were you homeless or living in a shelter (including now)? N   Transportation Needs    Has the lack of transportation kept you from medical appointments, meetings, work or from getting things needed for daily living? No   Food Insecurity   Within the past 12 months, you worried that your food would run out before you got the money to buy more. Never true   Within the past 12 months, the food you bought just didn't last and you didn't have money to get more. Never true   Utilities   In the past 12 months has the electric, gas, oil, or water company threatened to shut off services in your home? No     Patients adult children live with him. He has not used HH services, uses a CPAP. He does drive. Other needs TBD.

## 2024-07-24 NOTE — CONSULTS
[facility]  [facility]ult Note    Patient Name: Daniel Kirkland  MRN: [id number]  Admission Date: 7/23/2024  Hospital Length of Stay: 0 days  Code Status: Prior   Attending Provider:  Dr. Brenden Romero  Consulting Provider: Matilda Camargo NP  Primary Care Physician: Gonzalez Simmons III, MD  Principal Problem:<principal problem not specified>    Patient information was obtained from patient, past medical records, and ER records.     Inpatient consult to Telemedicine-Card  Consult performed by: Matilda Camargo NP  Consult ordered by: Brenden Romero DO        Subjective:     Chief Complaint:  right shoulder pain that goes into CP      HPI: 50 yo male who is known to [facility], sees Dr. Jacobs with history of normal coronaries in 2016, HTN, PAF on NOAC, SOFIYA, Morbid obesity presents to ER with c/o right sided shoulder pain that started while sitting in chair and is described as a burning type pain going into chest with diaphoresis, constant, worse if he keeps his arm still with some relief if he moves his arm. He c/o SOB but states that it is chronic, no N/V, palpitations or presyncope. He did have some diaphoresis. It has been unrelieved by NTG x 3 and GI cocktail. He denies any muscle strain but states that he fell about a week ago and bruised his leg. Work up reveals negative initial troponin, no ischemic changes on EKG. [facility] consulted by Dr. Romero for CP.    Past Medical History:   Diagnosis Date    A-fib     CHF (congestive heart failure)     GERD (gastroesophageal reflux disease)     Hypertension     Lung disorder     Sleep apnea, unspecified      2016 [facility] normal coronaries  11/23/20 Echo: EF 60%    Past Surgical History:   Procedure Laterality Date    ANGIOGRAM, CORONARY, WITH LEFT HEART CATHETERIZATION         Review of patient's allergies indicates:  No Known Allergies    No current facility-administered medications on file prior to encounter.     Current Outpatient  Medications on File Prior to Encounter   Medication Sig    ADVAIR -21 mcg/actuation HFAA inhaler Inhale 2 puffs into the lungs every 12 (twelve) hours.    albuterol (PROVENTIL/VENTOLIN HFA) 90 mcg/actuation inhaler Inhale 2 puffs into the lungs every 4 to 6 hours as needed.    allopurinoL (ZYLOPRIM) 100 MG tablet Take 100 mg by mouth daily as needed (gout).    diltiaZEM (CARDIZEM LA) 360 mg 24 hr tablet Take 360 mg by mouth once daily.    doxazosin (CARDURA) 1 MG tablet Take 1 mg by mouth once daily.    furosemide (LASIX) 40 MG tablet Take 40 mg by mouth once daily.    pantoprazole (PROTONIX) 40 MG tablet Take 40 mg by mouth once daily.    potassium chloride SA (K-DUR,KLOR-CON) 20 MEQ tablet Take 20 mEq by mouth once.    SOTALOL AF 80 mg tablet Take 80 mg by mouth 2 (two) times daily.    XARELTO 20 mg Tab Take 20 mg by mouth once daily.     Family History    None       Tobacco Use    Smoking status: Every Day     Types: Vaping with nicotine    Smokeless tobacco: Never   Substance and Sexual Activity    Alcohol use: Never    Drug use: Never    Sexual activity: Not on file     Review of Systems   Constitutional: Negative.   HENT: Negative.     Eyes: Negative.    Cardiovascular:  Positive for chest pain.   Respiratory:  Positive for shortness of breath (chronic).    Endocrine: Negative.    Hematologic/Lymphatic: Negative.    Skin: Negative.    Musculoskeletal:         Right shoulder pain   Gastrointestinal: Negative.    Genitourinary: Negative.    Neurological: Negative.    Psychiatric/Behavioral: Negative.     Allergic/Immunologic: Negative.      Objective:     Vital Signs (Most Recent):  Temp: 98.1 °F (36.7 °C) (07/23/24 2121)  Pulse: 68 (07/23/24 2310)  Resp: 17 (07/23/24 2310)  BP: 112/80 (07/23/24 2310)  SpO2: 96 % (07/23/24 2310) Vital Signs (24h Range):  Temp:  [98.1 °F (36.7 °C)] 98.1 °F (36.7 °C)  Pulse:  [68-77] 68  Resp:  [12-36] 17  SpO2:  [95 %-98 %] 96 %  BP: (106-183)/() 112/80     Weight:  (!) 237.9 kg (524 lb 8 oz)  Body mass index is 71.13 kg/m².    SpO2: 96 %       No intake or output data in the 24 hours ending 07/23/24 2339    Lines/Drains/Airways       Peripheral Intravenous Line  Duration                  Peripheral IV - Single Lumen 07/23/24 2123 20 G Left Antecubital <1 day                        Significant Labs:   Recent Lab Results         07/23/24 2121        Albumin/Globulin Ratio 1.1       Albumin 3.9       ALP 85       ALT 47       Anion Gap 7.0       AST 41       Baso # 0.04       Basophil % 0.6       BILIRUBIN TOTAL 1.1       BUN 11       BUN/CREAT RATIO 12       Calcium 9.4       Chloride 109       CO2 24       Creatinine 0.91       D-Dimer 0.49       eGFR >90  Comment:                      EGFR INTERPRETATION    Beginning 8/15/22 we are reporting the eGFRcr calculation as recommended by the National Kid[employer]. The eGFRcr equation has similar overall performance characteristics to the older equation, but the values may differ by more than 10% particularly at higher values of eGFRcr and younger adult ages.    NKF stages of chronic kidney disease (CKD)  Stage 1: Kidney damage with normal or increased eGFR (>90 mL/min/1.73 m^2)  Stage 2: Mild reduction in GFR (60-89 mL/min/1.73 m^2)  Stage 3a: Moderate reduction in GFR (45-59 mL/min/1.73 m^2)  Stage 3b: Moderate reduction in GFR (30-44 mL/min/1.73 m^2)  Stage 4: Severe reduction in GFR (15-29 mL/min/1.73 m^2)  Stage 5: Kidney failure (GFR <15 mL/min/1.73 m^2)           Eos # 0.27       Eos % 3.8       Globulin, Total 3.7       Glucose 139       Hematocrit 47.2       Hemoglobin 15.9       Immature Grans (Abs) 0.03       Immature Granulocytes 0.4       Lipase 37       Lymph # 2.24       LYMPH % 31.4       MCH 30.9       MCHC 33.7       MCV 91.7       Mono # 0.59       Mono % 8.3       MPV 11.1       Neut # 3.97       Neut % 55.5       nRBC 0.0       Platelet Count 178       Potassium 4.4       ProBNP 159.0  Comment:   For  "ambulatory patients presenting to outpatient facilities with clinical suspicion of HF not previously diagnosed and at least one sign, symptom or risk factor for HF, NT-proBNP II test results should be interpreted as indicated below:    <125(pg/mL):"Negative: Heart Failure Unlikely"    >=125(pg/mL):"Consider Heart Failure as well as other causes of NT-proBNP elevation"             PROTEIN TOTAL 7.6       RBC 5.15       RDW 12.8       Sodium 140       Troponin I <0.012       WBC 7.14               Significant Imaging: EKG: SR, 1st degree AVB    Physical Exam  Vitals reviewed.   Constitutional:       Appearance: Normal appearance. He is obese.      Comments: Tatoos to left upper arm   HENT:      Head: Normocephalic and atraumatic.      Nose: Nose normal.   Eyes:      Extraocular Movements: Extraocular movements intact.   Cardiovascular:      Rate and Rhythm: Normal rate and regular rhythm.      Pulses: Normal pulses.      Heart sounds: Normal heart sounds.   Pulmonary:      Effort: Pulmonary effort is normal.      Breath sounds: Normal breath sounds.   Neurological:      General: No focal deficit present.      Mental Status: He is alert and oriented to person, place, and time.           Current Inpatient Medications:    Current Facility-Administered Medications:     ketorolac injection 30 mg, 30 mg, Intravenous, ED 1 Time, Brenden Romero DO    nitroGLYCERIN SL tablet 0.4 mg, 0.4 mg, Sublingual, Q5 Min PRN, Brenden Romero DO    Current Outpatient Medications:     ADVAIR -21 mcg/actuation HFAA inhaler, Inhale 2 puffs into the lungs every 12 (twelve) hours., Disp: , Rfl:     albuterol (PROVENTIL/VENTOLIN HFA) 90 mcg/actuation inhaler, Inhale 2 puffs into the lungs every 4 to 6 hours as needed., Disp: , Rfl:     allopurinoL (ZYLOPRIM) 100 MG tablet, Take 100 mg by mouth daily as needed (gout)., Disp: , Rfl:     diltiaZEM (CARDIZEM LA) 360 mg 24 hr tablet, Take 360 mg by mouth once daily., Disp: , Rfl:     " doxazosin (CARDURA) 1 MG tablet, Take 1 mg by mouth once daily., Disp: , Rfl:     furosemide (LASIX) 40 MG tablet, Take 40 mg by mouth once daily., Disp: , Rfl:     pantoprazole (PROTONIX) 40 MG tablet, Take 40 mg by mouth once daily., Disp: , Rfl:     potassium chloride SA (K-DUR,KLOR-CON) 20 MEQ tablet, Take 20 mEq by mouth once., Disp: , Rfl:     SOTALOL AF 80 mg tablet, Take 80 mg by mouth 2 (two) times daily., Disp: , Rfl:     XARELTO 20 mg Tab, Take 20 mg by mouth once daily., Disp: , Rfl:       VTE Risk Mitigation (From admission, onward)      None          Assessment :     Chest pain  Normal coronaries 2016  HTN II  PAF currently SR - on NOAC  SOFIYA  Morbid obesity    Plan:     Right shoulder pain radiating to chest described as a burning sensation that is relieved with movement and unrelieved by NTG or GI cocktail. Initial troponin is negative and EKG has no ischemia. Recommend repeating 3 hour troponin and if remains negative, I recommend investigating other etiologies of CP and shoulder pain. Consider trying toradol.  If troponin rises, treat as ACS and transfer to facility with cardiology for further evaluation and treatment.   Resume cardiac meds.    Patient was last seen August, 2023. Recommend follow up with Dr. Jacobs after discharge.    Thank you for allowing me to participate in this patient's care. Call with questions.      Matilda Camargo NP  Cardiology  [facility]  07/23/2024 11:39 PM

## 2024-07-24 NOTE — NURSING
Nurses Note -- 4 Eyes      7/24/2024   12:42 PM      Skin assessed during: Q Shift Change      [x] No Altered Skin Integrity Present    []Prevention Measures Documented      [] Yes- Altered Skin Integrity Present or Discovered   [] LDA Added if Not in Epic (Describe Wound)   [] New Altered Skin Integrity was Present on Admit and Documented in LDA   [] Wound Image Taken    Wound Care Consulted? No    Attending Nurse:  Alisa Abreu RN/Staff Member:  Gold

## 2024-07-24 NOTE — H&P
[facility] Tele[facility]    Cardiology  History and Physical     Patient Name: Daniel Kirkladn  MRN: [id number]  Admission Date: 7/24/2024  Code Status: Prior   Attending Provider: Hao Montoya MD   Primary Care Physician: Gonzalez Simmons III, MD  Principal Problem:<principal problem not specified>    Patient information was obtained from patient, past medical records, and ER records.     Subjective:     Chief Complaint:  NSTEMI     HPI:  Patient is a 48 yo male who is known to [facility], sees Dr. Jacobs, with history of normal coronaries in 2016, HTN, PAF on NOAC, SOFIYA, Morbid obesity, and HTN.  He presented to ER in [place] with c/o right sided shoulder pain that started while sitting in chair and was described as a burning type pain going into chest with diaphoresis, constant, and worse if he keeps his arm still with some relief if he moves his arm. He c/o SOB but states that it is chronic, no N/V, palpitations or presyncope. He did report having some diaphoresis. Upon arrival he was given Sl NTG x 3 and GI cocktail which did relieve his pain. He denies any muscle strain but states that he fell about a week ago and bruised his leg. Work up revealed negative initial troponin, and no ischemic changes on EKG. He was sent to [facility] for further work-up and evaluation of his CP.      PMH: HTN, PAF on Xarelto, SOFIYA  PSH: Unknown  Family History: Father and Brother: MI  Social History: Former Smoker    Previous Cardiac Diagnostics:   TTE (11.23.2020):  1. The study quality is average.   2. The left ventricle is mildly enlarged. Global left ventricular systolic function is normal. The left ventricular ejection fraction is 60%. The left ventricle diastolic function is impaired (Grade I) with normal left atrial pressure. Noted left ventricular hypertrophy. Concentric left ventricular hypertrophy is present. It is mild.   3. All valvular structures appear to be normal.  4. The pulmonary artery systolic  pressure is 15 mmHg. The pulmonary artery was poorly visualized.  5.  Dilatation of the aortic root and ascending aorta is noted.      [facility] (10.05.2016):  Normal Coronaries    Past Medical History:   Diagnosis Date    A-fib     CHF (congestive heart failure)     GERD (gastroesophageal reflux disease)     Hypertension     Lung disorder     Sleep apnea, unspecified        Past Surgical History:   Procedure Laterality Date    ANGIOGRAM, CORONARY, WITH LEFT HEART CATHETERIZATION         Review of patient's allergies indicates:  No Known Allergies    Current Facility-Administered Medications on File Prior to Encounter   Medication    [COMPLETED] aluminum-magnesium hydroxide-simethicone 200-200-20 mg/5 mL suspension 30 mL    And    [COMPLETED] LIDOcaine viscous HCl 2% oral solution 15 mL    [COMPLETED] aspirin tablet 325 mg    [COMPLETED] heparin 25,000 units in dextrose 5% (100 units/ml) IV bolus from bag LOW INTENSITY NOMOGRAM - [facility]    [COMPLETED] ketorolac injection 30 mg    [COMPLETED] morphine injection 4 mg    [COMPLETED] nitroGLYCERIN (NITROSTAT) 0.4 MG SL tablet    [COMPLETED] nitroGLYCERIN 2% TD oint ointment 1 inch    [COMPLETED] ondansetron injection 4 mg    [DISCONTINUED] heparin 25,000 units in dextrose 5% (100 units/ml) IV bolus from bag LOW INTENSITY NOMOGRAM - [facility]    [DISCONTINUED] heparin 25,000 units in dextrose 5% (100 units/ml) IV bolus from bag LOW INTENSITY NOMOGRAM - [facility]    [DISCONTINUED] heparin 25,000 units in dextrose 5% 250 mL (100 units/mL) infusion LOW INTENSITY nomogram - [facility]    [DISCONTINUED] nitroGLYCERIN SL tablet 0.4 mg     Current Outpatient Medications on File Prior to Encounter   Medication Sig    ADVAIR -21 mcg/actuation HFAA inhaler Inhale 2 puffs into the lungs every 12 (twelve) hours.    albuterol (PROVENTIL/VENTOLIN HFA) 90 mcg/actuation inhaler Inhale 2 puffs into the lungs every 4 to 6 hours as needed.    allopurinoL (ZYLOPRIM) 100 MG tablet Take 100 mg by mouth daily  as needed (gout).    diltiaZEM (CARDIZEM LA) 360 mg 24 hr tablet Take 360 mg by mouth once daily.    doxazosin (CARDURA) 1 MG tablet Take 1 mg by mouth once daily.    furosemide (LASIX) 40 MG tablet Take 40 mg by mouth once daily.    pantoprazole (PROTONIX) 40 MG tablet Take 40 mg by mouth once daily.    potassium chloride SA (K-DUR,KLOR-CON) 20 MEQ tablet Take 20 mEq by mouth once.    SOTALOL AF 80 mg tablet Take 80 mg by mouth 2 (two) times daily.    XARELTO 20 mg Tab Take 20 mg by mouth once daily.     Family History    None       Tobacco Use    Smoking status: Every Day     Types: Vaping with nicotine    Smokeless tobacco: Never   Substance and Sexual Activity    Alcohol use: Never    Drug use: Never    Sexual activity: Not on file     Review of Systems   Constitutional: Negative.   Cardiovascular:  Positive for chest pain.   Respiratory:  Negative for cough and shortness of breath.    Gastrointestinal: Negative.    Genitourinary: Negative.    Neurological: Negative.      Objective:     Vital Signs (Most Recent):  Temp: 98.1 °F (36.7 °C) (07/24/24 0742)  Pulse: (!) 58 (07/24/24 0742)  Resp: 18 (07/24/24 0742)  BP: 107/64 (07/24/24 0742)  SpO2: 95 % (07/24/24 0742) Vital Signs (24h Range):  Temp:  [98.1 °F (36.7 °C)-98.8 °F (37.1 °C)] 98.1 °F (36.7 °C)  Pulse:  [53-77] 58  Resp:  [12-36] 18  SpO2:  [93 %-99 %] 95 %  BP: ()/() 107/64     Weight: (!) 234.7 kg (517 lb 7 oz)  Body mass index is 70.18 kg/m².    SpO2: 95 %       No intake or output data in the 24 hours ending 07/24/24 0811    Lines/Drains/Airways       Peripheral Intravenous Line  Duration                  Peripheral IV - Single Lumen 07/23/24 0000 20 G Anterior;Distal;Left Upper Arm 1 day                    Physical Exam  Constitutional:       Appearance: He is obese.   HENT:      Head: Normocephalic.   Cardiovascular:      Rate and Rhythm: Normal rate and regular rhythm.   Pulmonary:      Effort: Pulmonary effort is normal.      Breath  sounds: Normal breath sounds.   Skin:     General: Skin is warm and dry.   Neurological:      Mental Status: He is alert and oriented to person, place, and time.   Psychiatric:         Mood and Affect: Mood normal.         Behavior: Behavior normal.         EKG:     Telemetry: SB-SR    Home Medications:   Current Facility-Administered Medications on File Prior to Encounter   Medication Dose Route Frequency Provider Last Rate Last Admin    [COMPLETED] aluminum-magnesium hydroxide-simethicone 200-200-20 mg/5 mL suspension 30 mL  30 mL Oral Once Brenden Romero DO   30 mL at 07/23/24 2248    And    [COMPLETED] LIDOcaine viscous HCl 2% oral solution 15 mL  15 mL Oral Once Brenden Romero DO   15 mL at 07/23/24 2248    [COMPLETED] aspirin tablet 325 mg  325 mg Oral ED 1 Time Brenden Romero DO   325 mg at 07/23/24 2121    [COMPLETED] heparin 25,000 units in dextrose 5% (100 units/ml) IV bolus from bag LOW INTENSITY NOMOGRAM - [facility]  28.2 Units/kg (Adjusted) Intravenous Once Brenden Romero DO   4,000 Units at 07/24/24 0030    [COMPLETED] ketorolac injection 30 mg  30 mg Intravenous ED 1 Time Brenden Romero DO   30 mg at 07/23/24 2351    [COMPLETED] morphine injection 4 mg  4 mg Intravenous Once Brenden Romero DO   4 mg at 07/24/24 0022    [COMPLETED] nitroGLYCERIN (NITROSTAT) 0.4 MG SL tablet        1.2 mg at 07/23/24 2200    [COMPLETED] nitroGLYCERIN 2% TD oint ointment 1 inch  1 inch Topical (Top) ED 1 Time Brenden Romero DO   1 inch at 07/23/24 2120    [COMPLETED] ondansetron injection 4 mg  4 mg Intravenous ED 1 Time Brenden Romero DO   4 mg at 07/24/24 0024    [DISCONTINUED] heparin 25,000 units in dextrose 5% (100 units/ml) IV bolus from bag LOW INTENSITY NOMOGRAM - [facility]  28.2 Units/kg (Adjusted) Intravenous PRN Brenden Romero DO        [DISCONTINUED] heparin 25,000 units in dextrose 5% (100 units/ml) IV bolus from bag LOW INTENSITY NOMOGRAM - [facility]  30 Units/kg (Adjusted) Intravenous PRN Rebjorgito,  DO Brenden        [DISCONTINUED] heparin 25,000 units in dextrose 5% 250 mL (100 units/mL) infusion LOW INTENSITY nomogram - [facility]  0-40 Units/kg/hr (Adjusted) Intravenous Continuous Brenden Romero DO 17 mL/hr at 07/24/24 0030 12 Units/kg/hr at 07/24/24 0030    [DISCONTINUED] nitroGLYCERIN SL tablet 0.4 mg  0.4 mg Sublingual Q5 Min PRN Brenden Romero DO         Current Outpatient Medications on File Prior to Encounter   Medication Sig Dispense Refill    ADVAIR -21 mcg/actuation HFAA inhaler Inhale 2 puffs into the lungs every 12 (twelve) hours.      albuterol (PROVENTIL/VENTOLIN HFA) 90 mcg/actuation inhaler Inhale 2 puffs into the lungs every 4 to 6 hours as needed.      allopurinoL (ZYLOPRIM) 100 MG tablet Take 100 mg by mouth daily as needed (gout).      diltiaZEM (CARDIZEM LA) 360 mg 24 hr tablet Take 360 mg by mouth once daily.      doxazosin (CARDURA) 1 MG tablet Take 1 mg by mouth once daily.      furosemide (LASIX) 40 MG tablet Take 40 mg by mouth once daily.      pantoprazole (PROTONIX) 40 MG tablet Take 40 mg by mouth once daily.      potassium chloride SA (K-DUR,KLOR-CON) 20 MEQ tablet Take 20 mEq by mouth once.      SOTALOL AF 80 mg tablet Take 80 mg by mouth 2 (two) times daily.      XARELTO 20 mg Tab Take 20 mg by mouth once daily.       Current Schedule Inpatient Medications:   [START ON 7/25/2024] mupirocin   Nasal BID     Continuous Infusions:   heparin (porcine) in D5W  0-40 Units/kg/hr (Adjusted) Intravenous Continuous 16.8 mL/hr at 07/24/24 0722 12 Units/kg/hr at 07/24/24 0722     Significant Labs:   Chemistries:   Recent Labs   Lab 07/23/24 2121 07/23/24  2337 07/24/24  0238 07/24/24  0706     --   --   --    K 4.4  --   --   --      --   --   --    CO2 24  --   --   --    BUN 11  --   --   --    CREATININE 0.91  --   --   --    CALCIUM 9.4  --   --   --    BILITOT 1.1*  --   --   --    ALKPHOS 85  --   --   --    ALT 47*  --   --   --    AST 41  --   --   --    GLUCOSE  139*  --   --   --    TROPONINI <0.012 1.020* 7.080* 16.216*        CBC/Anemia Labs: Coags:    Recent Labs   Lab 07/23/24  2121 07/24/24  0706   WBC 7.14 10.33   HGB 15.9 14.5   HCT 47.2 43.3    205   MCV 91.7 92.5   RDW 12.8 13.1    Recent Labs   Lab 07/24/24  0012 07/24/24  0018 07/24/24  0706   INR  --  1.1 1.6*   APTT 27.1  --   --         Significant Imaging:   Assessment and Plan:   ASSESSMENT:  NSTEMI Type I      -Trop 1.020, 7.08, 16.216  HTN  PAF on Xarelto -- Last dose 7.23.24      -ChadsVasc 2  Grade I Diastolic Dysfunction  Morbid Obesity      -236.2 Kg  SOFIYA on CPAP  Hx BLE Edema  Family h/o premature CAD  Former Smoker        PLAN:  Get Echo  Keep NPO for LHC today  Continue Heparin gtt  ASA 81 mg PO Daily  Start Atorvastatin 40 mg PO Daily  Continue Sotalol, Cardizem  Lasix 40 mg IV BID  Hold Xarelto today; Resume 48 hours post LHC  Low Na Diet. 2 L fluid Restriction  Will Consider SGLT2 prior to d/c  Risk, Benefits and Alternatives Reviewed and Discussed with the PT and their Family and they wish to proceed with above Procedure.   Consents obtained and in chart  EKG and Labs in AM: CBC, CMP, Mg, FLP        VTE Risk Mitigation (From admission, onward)           Ordered     heparin 25,000 units in dextrose 5% (100 units/ml) IV bolus from bag LOW INTENSITY nomogram - [facility]  As needed (PRN)        Question:  Heparin Infusion Adjustment (DO NOT MODIFY ANSWER)  Answer:  \\[email or web address]\epic\Images\Pharmacy\HeparinInfusions\heparin LOW INTENSITY nomogram for [facility] [id number]    07/24/24 0653     heparin 25,000 units in dextrose 5% (100 units/ml) IV bolus from bag LOW INTENSITY nomogram - [facility]  As needed (PRN)        Question:  Heparin Infusion Adjustment (DO NOT MODIFY ANSWER)  Answer:  \\[email or web address]\epic\Images\Pharmacy\HeparinInfusions\heparin LOW INTENSITY nomogram for [facility] [id number]    07/24/24 0653     heparin 25,000 units in dextrose 5% 250 mL (100 units/mL) infusion LOW INTENSITY nomogram - [facility]   Continuous        Question:  Begin at (units/kg/hr)  Answer:  12    07/24/24 0653                    Lili Ma, MARJORIE  Cardiology  [facility] Tele[facility]  07/24/2024 8:11 AM     Physician addendum:  I have seen and examined this patient as a split-shared visit with the ROJELIO d/t complicated medical management of above problems written in assessment and high acuity requiring physician expertise in medical decision-making. I performed the substantive portion of the history and exam. Above medical decision-making is also formulated by me.    Plan:  Noted trop leak with active CP. Will need cath today d/t active CP - NSTEMI type I. Looks also severely volume overloaded also. Will need diuresis over the next week. Has at least 25 lbs of fluid. Further recs post op.     Abdullahi Velasquez MD [facility]  Int. Cardiologist

## 2024-07-24 NOTE — Clinical Note
The catheter was inserted into the left ventricle. Hemodynamics were performed.  and Pullback was recorded.  EF 30%

## 2024-07-25 VITALS
SYSTOLIC BLOOD PRESSURE: 119 MMHG | TEMPERATURE: 98 F | HEIGHT: 72 IN | OXYGEN SATURATION: 96 % | WEIGHT: 315 LBS | DIASTOLIC BLOOD PRESSURE: 74 MMHG | RESPIRATION RATE: 18 BRPM | BODY MASS INDEX: 42.66 KG/M2 | HEART RATE: 62 BPM

## 2024-07-25 PROBLEM — I21.4 NSTEMI (NON-ST ELEVATED MYOCARDIAL INFARCTION): Status: ACTIVE | Noted: 2024-07-25

## 2024-07-25 LAB
ALBUMIN SERPL-MCNC: 3.5 G/DL (ref 3.5–5)
ALBUMIN/GLOB SERPL: 1.1 RATIO (ref 1.1–2)
ALP SERPL-CCNC: 69 UNIT/L (ref 40–150)
ALT SERPL-CCNC: 41 UNIT/L (ref 0–55)
ANION GAP SERPL CALC-SCNC: 8 MEQ/L
APTT PPP: 78.2 SECONDS (ref 23.2–33.7)
APTT PPP: 85.6 SECONDS (ref 23.2–33.7)
APTT PPP: 90.4 SECONDS (ref 23.2–33.7)
AST SERPL-CCNC: 74 UNIT/L (ref 5–34)
BASOPHILS # BLD AUTO: 0.04 X10(3)/MCL
BASOPHILS NFR BLD AUTO: 0.5 %
BILIRUB SERPL-MCNC: 1.2 MG/DL
BUN SERPL-MCNC: 13.3 MG/DL (ref 8.4–25.7)
CALCIUM SERPL-MCNC: 9.1 MG/DL (ref 8.4–10.2)
CHLORIDE SERPL-SCNC: 105 MMOL/L (ref 98–107)
CHOLEST SERPL-MCNC: 168 MG/DL
CHOLEST/HDLC SERPL: 4 {RATIO} (ref 0–5)
CO2 SERPL-SCNC: 27 MMOL/L (ref 22–29)
CREAT SERPL-MCNC: 0.89 MG/DL (ref 0.73–1.18)
CREAT/UREA NIT SERPL: 15
EOSINOPHIL # BLD AUTO: 0.22 X10(3)/MCL (ref 0–0.9)
EOSINOPHIL NFR BLD AUTO: 2.8 %
ERYTHROCYTE [DISTWIDTH] IN BLOOD BY AUTOMATED COUNT: 13.1 % (ref 11.5–17)
GFR SERPLBLD CREATININE-BSD FMLA CKD-EPI: >60 ML/MIN/1.73/M2
GLOBULIN SER-MCNC: 3.3 GM/DL (ref 2.4–3.5)
GLUCOSE SERPL-MCNC: 105 MG/DL (ref 74–100)
HCT VFR BLD AUTO: 45 % (ref 42–52)
HDLC SERPL-MCNC: 40 MG/DL (ref 35–60)
HGB BLD-MCNC: 14.8 G/DL (ref 14–18)
IMM GRANULOCYTES # BLD AUTO: 0.03 X10(3)/MCL (ref 0–0.04)
IMM GRANULOCYTES NFR BLD AUTO: 0.4 %
LDLC SERPL CALC-MCNC: 108 MG/DL (ref 50–140)
LYMPHOCYTES # BLD AUTO: 2.37 X10(3)/MCL (ref 0.6–4.6)
LYMPHOCYTES NFR BLD AUTO: 30.6 %
MAGNESIUM SERPL-MCNC: 2.1 MG/DL (ref 1.6–2.6)
MCH RBC QN AUTO: 30.8 PG (ref 27–31)
MCHC RBC AUTO-ENTMCNC: 32.9 G/DL (ref 33–36)
MCV RBC AUTO: 93.8 FL (ref 80–94)
MONOCYTES # BLD AUTO: 0.64 X10(3)/MCL (ref 0.1–1.3)
MONOCYTES NFR BLD AUTO: 8.3 %
NEUTROPHILS # BLD AUTO: 4.45 X10(3)/MCL (ref 2.1–9.2)
NEUTROPHILS NFR BLD AUTO: 57.4 %
NRBC BLD AUTO-RTO: 0 %
OHS QRS DURATION: 108 MS
OHS QTC CALCULATION: 442 MS
PLATELET # BLD AUTO: 196 X10(3)/MCL (ref 130–400)
PMV BLD AUTO: 12.2 FL (ref 7.4–10.4)
POTASSIUM SERPL-SCNC: 4.3 MMOL/L (ref 3.5–5.1)
PROT SERPL-MCNC: 6.8 GM/DL (ref 6.4–8.3)
RBC # BLD AUTO: 4.8 X10(6)/MCL (ref 4.7–6.1)
SODIUM SERPL-SCNC: 140 MMOL/L (ref 136–145)
TRIGL SERPL-MCNC: 102 MG/DL (ref 34–140)
VLDLC SERPL CALC-MCNC: 20 MG/DL
WBC # BLD AUTO: 7.75 X10(3)/MCL (ref 4.5–11.5)

## 2024-07-25 PROCEDURE — 25000003 PHARM REV CODE 250: Performed by: INTERNAL MEDICINE

## 2024-07-25 PROCEDURE — 80061 LIPID PANEL: CPT | Performed by: NURSE PRACTITIONER

## 2024-07-25 PROCEDURE — 85730 THROMBOPLASTIN TIME PARTIAL: CPT | Performed by: NURSE PRACTITIONER

## 2024-07-25 PROCEDURE — 80053 COMPREHEN METABOLIC PANEL: CPT | Performed by: NURSE PRACTITIONER

## 2024-07-25 PROCEDURE — 85025 COMPLETE CBC W/AUTO DIFF WBC: CPT | Performed by: NURSE PRACTITIONER

## 2024-07-25 PROCEDURE — 63600175 PHARM REV CODE 636 W HCPCS: Performed by: NURSE PRACTITIONER

## 2024-07-25 PROCEDURE — 25000003 PHARM REV CODE 250: Performed by: NURSE PRACTITIONER

## 2024-07-25 PROCEDURE — 83735 ASSAY OF MAGNESIUM: CPT | Performed by: NURSE PRACTITIONER

## 2024-07-25 PROCEDURE — 36415 COLL VENOUS BLD VENIPUNCTURE: CPT | Performed by: NURSE PRACTITIONER

## 2024-07-25 PROCEDURE — 85730 THROMBOPLASTIN TIME PARTIAL: CPT | Performed by: INTERNAL MEDICINE

## 2024-07-25 RX ORDER — ATORVASTATIN CALCIUM 40 MG/1
40 TABLET, FILM COATED ORAL NIGHTLY
Qty: 30 TABLET | Refills: 11 | Status: SHIPPED | OUTPATIENT
Start: 2024-07-25 | End: 2025-07-25

## 2024-07-25 RX ORDER — FUROSEMIDE 40 MG/1
40 TABLET ORAL 2 TIMES DAILY
Qty: 60 TABLET | Refills: 11 | Status: SHIPPED | OUTPATIENT
Start: 2024-07-25

## 2024-07-25 RX ORDER — CLOPIDOGREL BISULFATE 75 MG/1
75 TABLET ORAL DAILY
Qty: 30 TABLET | Refills: 11 | Status: SHIPPED | OUTPATIENT
Start: 2024-07-26 | End: 2025-07-26

## 2024-07-25 RX ORDER — ASPIRIN 81 MG/1
81 TABLET ORAL DAILY
Qty: 30 TABLET | Refills: 11 | Status: SHIPPED | OUTPATIENT
Start: 2024-07-25 | End: 2025-07-25

## 2024-07-25 RX ADMIN — SOTALOL HYDROCHLORIDE 80 MG: 80 TABLET ORAL at 09:07

## 2024-07-25 RX ADMIN — MUPIROCIN: 20 OINTMENT TOPICAL at 09:07

## 2024-07-25 RX ADMIN — ASPIRIN 81 MG: 81 TABLET, COATED ORAL at 09:07

## 2024-07-25 RX ADMIN — RIVAROXABAN 20 MG: 10 TABLET, FILM COATED ORAL at 03:07

## 2024-07-25 RX ADMIN — HEPARIN SODIUM 12 UNITS/KG/HR: 10000 INJECTION, SOLUTION INTRAVENOUS at 12:07

## 2024-07-25 RX ADMIN — HEPARIN SODIUM 12 UNITS/KG/HR: 10000 INJECTION, SOLUTION INTRAVENOUS at 09:07

## 2024-07-25 RX ADMIN — PANTOPRAZOLE SODIUM 40 MG: 40 TABLET, DELAYED RELEASE ORAL at 09:07

## 2024-07-25 RX ADMIN — CLOPIDOGREL BISULFATE 75 MG: 75 TABLET ORAL at 09:07

## 2024-07-25 RX ADMIN — FUROSEMIDE 40 MG: 10 INJECTION, SOLUTION INTRAMUSCULAR; INTRAVENOUS at 09:07

## 2024-07-25 NOTE — NURSING
Nurses Note -- 4 Eyes      7/25/2024   12:04 PM      Skin assessed during: Q Shift Change      [x] No Altered Skin Integrity Present    []Prevention Measures Documented      [] Yes- Altered Skin Integrity Present or Discovered   [] LDA Added if Not in Epic (Describe Wound)   [] New Altered Skin Integrity was Present on Admit and Documented in LDA   [] Wound Image Taken    Wound Care Consulted? No    Attending Nurse:  Alisa Abreu RN/Staff Member:   [place]

## 2024-07-25 NOTE — NURSING
Pt educated on Chest pain. VSS. IV and tele removed. Pt verbalizes understanding and has no further complaints. Pt will follow up with PCP and cardio.

## 2024-07-25 NOTE — PLAN OF CARE
07/25/24 1534   Final Note   Assessment Type Final Discharge Note   Anticipated Discharge Disposition Home   Post-Acute Status   Discharge Delays None known at this time     Pt will dc to home. No needs noted for CM.

## 2024-07-25 NOTE — DISCHARGE SUMMARY
[facility] - [facility] Medical Telemetry    Cardiology  Discharge Summary      Patient Name: Daniel Kirkland  MRN: [id number]  Admission Date: 7/24/2024  Hospital Length of Stay: 1 days  Discharge Date and Time:  07/25/2024 1:23 PM  Attending Physician: Hao Montoya MD  Discharging Provider: MARJORIE Berry  Primary Care Physician: Gonzalez Simmons III, MD           Chief Complaint:  NSTEMI      HPI:  Patient is a 50 yo male who is known to [facility], sees Dr. Jacobs, with history of normal coronaries in 2016, HTN, PAF on NOAC, SOFIYA, Morbid obesity, and HTN.  He presented to ER in [place] with c/o right sided shoulder pain that started while sitting in chair and was described as a burning type pain going into chest with diaphoresis, constant, and worse if he keeps his arm still with some relief if he moves his arm. He c/o SOB but states that it is chronic, no N/V, palpitations or presyncope. He did report having some diaphoresis. Upon arrival he was given Sl NTG x 3 and GI cocktail which did relieve his pain. He denies any muscle strain but states that he fell about a week ago and bruised his leg. Work up revealed negative initial troponin, and no ischemic changes on EKG. He was sent to [facility] for further work-up and evaluation of his CP.        PMH: HTN, PAF on Xarelto, SOFIYA  PSH: Unknown  Family History: Father and Brother: MI  Social History: Former Smoker     Previous Cardiac Diagnostics:   TTE (7.24.24):    Left Ventricle: The left ventricle is normal in size. Normal wall thickness. There is normal systolic function with a visually estimated ejection fraction of 60 - 65%. There is normal diastolic function.    Right Ventricle: Normal right ventricular cavity size. Systolic function is normal. TAPSE is 2.73 cm.    Aortic Valve: There is mild aortic regurgitation.    Mitral Valve: There is no stenosis. The mean pressure gradient across the mitral valve is 3 mmHg at a heart rate of 62 bpm. There is mild  regurgitation.    IVC/SVC: Elevated venous pressure at 15 mmHg.    The study was difficult due to patient's body habitus. Poor parasternal long and subcostal views. Definity was used for apical windows     [facility] (7.24.24):    The 1st Diag lesion was 80% stenosed with 0% stenosis post-intervention.    The ejection fraction was calculated to be 30%.    There was moderate to severe left ventricular systolic dysfunction.    The left ventricular end diastolic pressure was normal.    The pre-procedure left ventricular end diastolic pressure was 30.    The estimated blood loss was none.    There was single vessel coronary artery disease.    There was no mitral valve regurgitation.    There was no aortic valve stenosis.        Procedures performed:  1. Left heart catheterization, selective coronary angiography and left ventriculogram.    2. Moderate conscious sedation.    3. Successful percutaneous coronary intervention to the 1st diagonal branch, in the setting of an acute myocardial infarction with a troponin of 14 and an ejection fraction of 30% with severe anterior and apical hypokinesis.  This was likely a non-STEMI on the basis of a thrombotic event in the proximal LAD.  The intervention involved balloon PTCA to the 1st diagonal branch for an 80% occlusion with residual thrombus and 1 full dose of intracoronary Aggrastat (bolus).  The initial event was likely secondary to a large thrombus in the proximal LAD the resolved almost completely with administration of heparin, aspirin and further medications in the hospital.  The only residual thrombus was present in the first diagonal branch     TTE (11.23.2020):  1.             The study quality is average.   2.             The left ventricle is mildly enlarged. Global left ventricular systolic function is normal. The left ventricular ejection fraction is 60%. The left ventricle diastolic function is impaired (Grade I) with normal left atrial pressure. Noted left ventricular  hypertrophy. Concentric left ventricular hypertrophy is present. It is mild.   3.All valvular structures appear to be normal.  4.The pulmonary artery systolic pressure is 15 mmHg. The pulmonary artery was poorly visualized.  5.              Dilatation of the aortic root and ascending aorta is noted.        [facility] (10.05.2016):  Normal Coronaries    Procedure(s) (LRB):  Angiogram, Coronary, with Left Heart Cath (N/A)   Consults:   Final Active Diagnoses:    Diagnosis Date Noted POA    PRINCIPAL PROBLEM:  NSTEMI (non-ST elevated myocardial infarction) [I21.4] 07/25/2024 Yes      Problems Resolved During this Admission:     Discharged Condition: stable  Review of Systems   Constitutional: Negative.   Eyes: Negative.    Cardiovascular:  Negative for chest pain and dyspnea on exertion.   Respiratory:  Negative for cough and shortness of breath.    Skin: Negative.    Musculoskeletal: Negative.    Neurological: Negative.      Physical Exam  Constitutional:       Appearance: He is obese.   Cardiovascular:      Rate and Rhythm: Normal rate and regular rhythm.      Pulses:           Radial pulses are 2+ on the right side and 2+ on the left side.   Pulmonary:      Effort: Pulmonary effort is normal.      Breath sounds: Normal breath sounds.   Skin:     General: Skin is warm and dry.      Comments: Rt radial cath site without bleeding or hematoma   Neurological:      Mental Status: He is alert and oriented to person, place, and time.   Psychiatric:         Mood and Affect: Mood normal.         Behavior: Behavior normal.       Disposition: Home or Self Care  Follow Up:   Follow-up Information       Bereket Jacobs MD Follow up in 1 week(s).    Specialty: Cardiology  Why: Please make in [facility]  Contact information:  [phone] [facility] [id number] [phone]                           Patient Instructions:      Diet Cardiac     Lifting restrictions   Order Comments: Do not lift anything over 5-10 lbs with  Rt arm for 1 week     Activity as tolerated     Medications:  Reconciled Home Medications:      Medication List        START taking these medications      aspirin 81 MG EC tablet  Commonly known as: ECOTRIN  Take 1 tablet (81 mg total) by mouth once daily.     atorvastatin 40 MG tablet  Commonly known as: LIPITOR  Take 1 tablet (40 mg total) by mouth every evening.     clopidogreL 75 mg tablet  Commonly known as: PLAVIX  Take 1 tablet (75 mg total) by mouth once daily.  Start taking on: July 26, 2024            CHANGE how you take these medications      furosemide 40 MG tablet  Commonly known as: LASIX  Take 1 tablet (40 mg total) by mouth 2 (two) times a day.  What changed: when to take this            CONTINUE taking these medications      ADVAIR -21 mcg/actuation Hfaa inhaler  Generic drug: fluticasone-salmeterol 230-21 mcg/dose  Inhale 2 puffs into the lungs every 12 (twelve) hours.     albuterol 90 mcg/actuation inhaler  Commonly known as: PROVENTIL/VENTOLIN HFA  Inhale 2 puffs into the lungs every 4 to 6 hours as needed.     allopurinoL 100 MG tablet  Commonly known as: ZYLOPRIM  Take 100 mg by mouth daily as needed (gout).     diltiaZEM 360 mg 24 hr tablet  Commonly known as: CARDIZEM LA  Take 360 mg by mouth once daily.     doxazosin 1 MG tablet  Commonly known as: CARDURA  Take 1 mg by mouth once daily.     pantoprazole 40 MG tablet  Commonly known as: PROTONIX  Take 40 mg by mouth once daily.     potassium chloride SA 20 MEQ tablet  Commonly known as: K-DUR,KLOR-CON  Take 20 mEq by mouth once.     sotalol AF 80 mg tablet  Generic drug: sotaloL  Take 80 mg by mouth 2 (two) times daily.     XARELTO 20 mg Tab  Generic drug: rivaroxaban  Take 20 mg by mouth once daily.            Impression:  CAD    -s/p Balloon angioplasty to 1st Diag  NSTEMI Type I      -Trop 1.020, 7.08, 16.216  HTN (controlled)  PAF on Xarelto -- Last dose 7.23.24      -ChadsVasc 2  Grade I Diastolic Dysfunction  Morbid Obesity       -236.2 Kg  SOFIYA on CPAP  Hx BLE Edema  Family h/o premature CAD  Former Smoker           PLAN:  Echo Reviewed: EF 60-65% with normal Diastolic Function  D/C  Heparin gtt and Resume Xarelto 20 mg PO Daily  Continue ASA 81 mg PO Daily and Plavix 75 mg PO Daily  Continue Triple Therapy for now; will reassess as outpatient  Continue Atorvastatin 40 mg PO Daily  Continue Sotalol, Cardizem  Continue Lasix 40 mg IV BID until Euvolemic.   Will convert back to PO when Euvolemic   Protonix 40 mg Daily for GI protection  Patient educated on Low Na Diet. 2 L fluid Restriction daily  Ok to d/c home  F/U with Dr. Jacosb on  8.14.24 at 3:15 PM           IMPORTANT Antiplatelet Medication Instructions:  The patient, Daniel Kirkland, was instructed by Lili Ma on the importance of Antiplatelet Medication(s) and he verbalizes understanding.   He will continue taking his present antiplatelet Plavix as prescribed.   His new antiplatelet Plavix  was sent to the [facility] pharmacy.  **WARNING:  Never stop **PLAVIX, EFFIENT, or BRILINTA** without first speaking to a [facility] provider** (even if another physician or surgeon insists it must be stopped for a procedure)    Time spent on the discharge of patient: 45 minutes    MARJORIE Berry  Cardiology  [facility] - [facility] Telemetry     I agree with the findings of the complexity of problems addressed and take responsibility for the plan's risks and complications. I approved the plan documented by Annelise Mendez NP.

## (undated) DEVICE — GUIDEWIRE RUNTHROUGH NS 180CM

## (undated) DEVICE — GUIDE LAUNCHER 6FR AL 2.0

## (undated) DEVICE — CATH EMERGE MR 20 X 2.50

## (undated) DEVICE — VALVE CONTROL COPILOT

## (undated) DEVICE — SET ANGIO ACIST CVI ANGIOTOUCH

## (undated) DEVICE — PAD DEFIB CADENCE ADULT R2

## (undated) DEVICE — CATH FL 3.5 5FR

## (undated) DEVICE — DRAPE ANGIO BRACH 38X44IN

## (undated) DEVICE — CATH IMPULSE FL4 5FR 100CM

## (undated) DEVICE — BAND TR WITH INFLATOR

## (undated) DEVICE — CANNULA ADULT NASAL 7FT

## (undated) DEVICE — CATH GUID EBU4 LAUNCH 6FRX100

## (undated) DEVICE — GUIDEWIRE INQWIRE SE 3MM JTIP

## (undated) DEVICE — Device

## (undated) DEVICE — DEVICE INDEFLATOR BASIX

## (undated) DEVICE — COVER PROBE US 5.5X58L NON LTX

## (undated) DEVICE — SHEATH GLIDE SLENDER 6FR

## (undated) DEVICE — CATH IMPULSE FR4 5FR 125CM